# Patient Record
Sex: MALE | Race: WHITE | HISPANIC OR LATINO | Employment: PART TIME | URBAN - METROPOLITAN AREA
[De-identification: names, ages, dates, MRNs, and addresses within clinical notes are randomized per-mention and may not be internally consistent; named-entity substitution may affect disease eponyms.]

---

## 2017-02-18 ENCOUNTER — APPOINTMENT (OUTPATIENT)
Dept: LAB | Facility: HOSPITAL | Age: 42
End: 2017-02-18
Payer: COMMERCIAL

## 2017-02-18 ENCOUNTER — TRANSCRIBE ORDERS (OUTPATIENT)
Dept: LAB | Facility: HOSPITAL | Age: 42
End: 2017-02-18

## 2017-02-18 DIAGNOSIS — Z79.899 OTHER LONG TERM (CURRENT) DRUG THERAPY: ICD-10-CM

## 2017-02-18 DIAGNOSIS — G40.909 EPILEPSY WITHOUT STATUS EPILEPTICUS, NOT INTRACTABLE (HCC): ICD-10-CM

## 2017-02-18 LAB
ALBUMIN SERPL BCP-MCNC: 3.4 G/DL (ref 3.5–5)
ALP SERPL-CCNC: 62 U/L (ref 46–116)
ALT SERPL W P-5'-P-CCNC: 79 U/L (ref 12–78)
ANION GAP SERPL CALCULATED.3IONS-SCNC: 6 MMOL/L (ref 4–13)
AST SERPL W P-5'-P-CCNC: 69 U/L (ref 5–45)
BASOPHILS # BLD AUTO: 0.04 THOUSANDS/ΜL (ref 0–0.1)
BASOPHILS NFR BLD AUTO: 1 % (ref 0–1)
BILIRUB SERPL-MCNC: 1.12 MG/DL (ref 0.2–1)
BUN SERPL-MCNC: 18 MG/DL (ref 5–25)
CALCIUM SERPL-MCNC: 8.8 MG/DL (ref 8.3–10.1)
CHLORIDE SERPL-SCNC: 102 MMOL/L (ref 100–108)
CHOLEST SERPL-MCNC: 123 MG/DL (ref 50–200)
CO2 SERPL-SCNC: 29 MMOL/L (ref 21–32)
CREAT SERPL-MCNC: 1.08 MG/DL (ref 0.6–1.3)
EOSINOPHIL # BLD AUTO: 0.04 THOUSAND/ΜL (ref 0–0.61)
EOSINOPHIL NFR BLD AUTO: 1 % (ref 0–6)
ERYTHROCYTE [DISTWIDTH] IN BLOOD BY AUTOMATED COUNT: 14.3 % (ref 11.6–15.1)
GFR SERPL CREATININE-BSD FRML MDRD: >60 ML/MIN/1.73SQ M
GLUCOSE SERPL-MCNC: 90 MG/DL (ref 65–140)
HCT VFR BLD AUTO: 47.5 % (ref 36.5–49.3)
HDLC SERPL-MCNC: 52 MG/DL (ref 40–60)
HGB BLD-MCNC: 16.1 G/DL (ref 12–17)
LDLC SERPL CALC-MCNC: 55 MG/DL (ref 0–100)
LYMPHOCYTES # BLD AUTO: 2.41 THOUSANDS/ΜL (ref 0.6–4.47)
LYMPHOCYTES NFR BLD AUTO: 50 % (ref 14–44)
MCH RBC QN AUTO: 30.7 PG (ref 26.8–34.3)
MCHC RBC AUTO-ENTMCNC: 33.9 G/DL (ref 31.4–37.4)
MCV RBC AUTO: 91 FL (ref 82–98)
MONOCYTES # BLD AUTO: 0.51 THOUSAND/ΜL (ref 0.17–1.22)
MONOCYTES NFR BLD AUTO: 11 % (ref 4–12)
NEUTROPHILS # BLD AUTO: 1.76 THOUSANDS/ΜL (ref 1.85–7.62)
NEUTS SEG NFR BLD AUTO: 37 % (ref 43–75)
NRBC BLD AUTO-RTO: 0 /100 WBCS
PLATELET # BLD AUTO: 287 THOUSANDS/UL (ref 149–390)
PMV BLD AUTO: 11.1 FL (ref 8.9–12.7)
POTASSIUM SERPL-SCNC: 5.5 MMOL/L (ref 3.5–5.3)
PROT SERPL-MCNC: 8 G/DL (ref 6.4–8.2)
RBC # BLD AUTO: 5.24 MILLION/UL (ref 3.88–5.62)
SODIUM SERPL-SCNC: 137 MMOL/L (ref 136–145)
TRIGL SERPL-MCNC: 82 MG/DL
WBC # BLD AUTO: 4.77 THOUSAND/UL (ref 4.31–10.16)

## 2017-02-18 PROCEDURE — 36415 COLL VENOUS BLD VENIPUNCTURE: CPT

## 2017-02-18 PROCEDURE — 80061 LIPID PANEL: CPT

## 2017-02-18 PROCEDURE — 85025 COMPLETE CBC W/AUTO DIFF WBC: CPT

## 2017-02-18 PROCEDURE — 80053 COMPREHEN METABOLIC PANEL: CPT

## 2017-02-20 ENCOUNTER — GENERIC CONVERSION - ENCOUNTER (OUTPATIENT)
Dept: OTHER | Facility: OTHER | Age: 42
End: 2017-02-20

## 2017-02-23 ENCOUNTER — ALLSCRIPTS OFFICE VISIT (OUTPATIENT)
Dept: OTHER | Facility: OTHER | Age: 42
End: 2017-02-23

## 2017-05-30 ENCOUNTER — APPOINTMENT (OUTPATIENT)
Dept: LAB | Facility: HOSPITAL | Age: 42
End: 2017-05-30
Attending: PSYCHIATRY & NEUROLOGY
Payer: COMMERCIAL

## 2017-05-30 ENCOUNTER — TRANSCRIBE ORDERS (OUTPATIENT)
Dept: LAB | Facility: HOSPITAL | Age: 42
End: 2017-05-30

## 2017-05-30 DIAGNOSIS — G40.909 EPILEPSY WITHOUT STATUS EPILEPTICUS, NOT INTRACTABLE (HCC): ICD-10-CM

## 2017-05-30 PROCEDURE — 80177 DRUG SCRN QUAN LEVETIRACETAM: CPT

## 2017-05-30 PROCEDURE — 36415 COLL VENOUS BLD VENIPUNCTURE: CPT

## 2017-06-01 LAB — LEVETIRACETAM SERPL-MCNC: 8.8 UG/ML (ref 10–40)

## 2017-06-05 ENCOUNTER — GENERIC CONVERSION - ENCOUNTER (OUTPATIENT)
Dept: OTHER | Facility: OTHER | Age: 42
End: 2017-06-05

## 2017-06-08 ENCOUNTER — ALLSCRIPTS OFFICE VISIT (OUTPATIENT)
Dept: OTHER | Facility: OTHER | Age: 42
End: 2017-06-08

## 2017-07-18 ENCOUNTER — ALLSCRIPTS OFFICE VISIT (OUTPATIENT)
Dept: OTHER | Facility: OTHER | Age: 42
End: 2017-07-18

## 2017-07-18 DIAGNOSIS — R74.8 ABNORMAL LEVELS OF OTHER SERUM ENZYMES: ICD-10-CM

## 2017-07-29 ENCOUNTER — TRANSCRIBE ORDERS (OUTPATIENT)
Dept: LAB | Facility: HOSPITAL | Age: 42
End: 2017-07-29

## 2017-07-29 ENCOUNTER — APPOINTMENT (OUTPATIENT)
Dept: LAB | Facility: HOSPITAL | Age: 42
End: 2017-07-29
Payer: COMMERCIAL

## 2017-07-29 DIAGNOSIS — R74.8 ABNORMAL LEVELS OF OTHER SERUM ENZYMES: ICD-10-CM

## 2017-07-29 LAB
ALBUMIN SERPL BCP-MCNC: 3.7 G/DL (ref 3.5–5)
ALP SERPL-CCNC: 55 U/L (ref 46–116)
ALT SERPL W P-5'-P-CCNC: 52 U/L (ref 12–78)
ANION GAP SERPL CALCULATED.3IONS-SCNC: 8 MMOL/L (ref 4–13)
AST SERPL W P-5'-P-CCNC: 30 U/L (ref 5–45)
BILIRUB SERPL-MCNC: 1.05 MG/DL (ref 0.2–1)
BUN SERPL-MCNC: 18 MG/DL (ref 5–25)
CALCIUM SERPL-MCNC: 8.9 MG/DL (ref 8.3–10.1)
CHLORIDE SERPL-SCNC: 102 MMOL/L (ref 100–108)
CO2 SERPL-SCNC: 28 MMOL/L (ref 21–32)
CREAT SERPL-MCNC: 1.22 MG/DL (ref 0.6–1.3)
GFR SERPL CREATININE-BSD FRML MDRD: 74 ML/MIN/1.73SQ M
GLUCOSE P FAST SERPL-MCNC: 89 MG/DL (ref 65–99)
POTASSIUM SERPL-SCNC: 4.1 MMOL/L (ref 3.5–5.3)
PROT SERPL-MCNC: 7.9 G/DL (ref 6.4–8.2)
SODIUM SERPL-SCNC: 138 MMOL/L (ref 136–145)

## 2017-07-29 PROCEDURE — 80053 COMPREHEN METABOLIC PANEL: CPT

## 2017-07-29 PROCEDURE — 36415 COLL VENOUS BLD VENIPUNCTURE: CPT

## 2017-09-10 ENCOUNTER — GENERIC CONVERSION - ENCOUNTER (OUTPATIENT)
Dept: OTHER | Facility: OTHER | Age: 42
End: 2017-09-10

## 2018-01-10 NOTE — RESULT NOTES
Verified Results  (1) COMPREHENSIVE METABOLIC PANEL [id number] 89:21JH Agueda Gregory Order Number: [id number]     Test Name Result Flag Reference   GLUCOSE,RANDM 90 mg/dL     If the patient is fasting, the ADA then defines impaired fasting glucose as > 100 mg/dL and diabetes as > or equal to 123 mg/dL  SODIUM 137 mmol/L  136-145   POTASSIUM 5 5 mmol/L H 3 5-5 3   Slightly Hemolyzed; Results May be Affected   CHLORIDE 102 mmol/L  100-108   CARBON DIOXIDE 29 mmol/L  21-32   ANION GAP (CALC) 6 mmol/L  4-13   BLOOD UREA NITROGEN 18 mg/dL  5-25   CREATININE 1 08 mg/dL  0 60-1 30   Standardized to IDMS reference method   CALCIUM 8 8 mg/dL  8 3-10 1   BILI, TOTAL 1 12 mg/dL H 0 20-1 00   ALK PHOSPHATAS 62 U/L     ALT (SGPT) 79 U/L H 12-78   AST(SGOT) 69 U/L H 5-45   Slightly Hemolyzed; Results May be Affected   ALBUMIN 3 4 g/dL L 3 5-5 0   TOTAL PROTEIN 8 0 g/dL  6 4-8 2   eGFR Non-African American      >60 0 ml/min/1 73sq m   - Patient Instructions: This is a fasting blood test  Water,black tea or black  coffee only after 9:00pm the night before test Drink 2 glasses of water the morning of test   National Kidney Disease Education Program recommendations are as follows:  GFR calculation is accurate only with a steady state creatinine  Chronic Kidney disease less than 60 ml/min/1 73 sq  meters  Kidney failure less than 15 ml/min/1 73 sq  meters       (1) CBC/PLT/DIFF [id number] 09:32AM [facility] Order Number: [id number]     Test Name Result Flag Reference   WBC COUNT 4 77 Thousand/uL  4 31-10 16   RBC COUNT 5 24 Million/uL  3 88-5 62   HEMOGLOBIN 16 1 g/dL  12 0-17 0   HEMATOCRIT 47 5 %  36 5-49 3   MCV 91 fL  82-98   MCH 30 7 pg  26 8-34 3   MCHC 33 9 g/dL  31 4-37 4   RDW 14 3 %  11 6-15 1   MPV 11 1 fL  8 9-12 7   PLATELET COUNT 483 Thousands/uL  149-390   nRBC AUTOMATED 0 /100 WBCs     NEUTROPHILS RELATIVE PERCENT 37 % L 43-75   LYMPHOCYTES RELATIVE PERCENT 50 % H 14-44   MONOCYTES RELATIVE PERCENT 11 %  4-12   EOSINOPHILS RELATIVE PERCENT 1 %  0-6   BASOPHILS RELATIVE PERCENT 1 %  0-1   NEUTROPHILS ABSOLUTE COUNT 1 76 Thousands/? ??L L 1 85-7 62   LYMPHOCYTES ABSOLUTE COUNT 2 41 Thousands/? ??L  0 60-4 47   MONOCYTES ABSOLUTE COUNT 0 51 Thousand/? ??L  0 17-1 22   EOSINOPHILS ABSOLUTE COUNT 0 04 Thousand/? ??L  0 00-0 61   BASOPHILS ABSOLUTE COUNT 0 04 Thousands/? ??L  0 00-0 10   - Patient Instructions: This bloodwork is non-fasting  Please drink two glasses of water morning of bloodwork  - Patient Instructions: This bloodwork is non-fasting  Please drink two glasses of water morning of bloodwork  (1) LIPID PANEL, FASTING [id number] 09:32AM Lyndsey Rouse Order Number: [id number]     Test Name Result Flag Reference   CHOLESTEROL 123 mg/dL     HDL,DIRECT 52 mg/dL  40-60   Specimen collection should occur prior to Metamizole administration due to the potential for falsely depressed results  LDL CHOLESTEROL CALCULATED 55 mg/dL  0-100   - Patient Instructions: This is a fasting blood test  Water,black tea or black  coffee only after 9:00pm the night before test   Drink 2 glasses of water the morning of test     - Patient Instructions: This is a fasting blood test  Water,black tea or black  coffee only after 9:00pm the night before test Drink 2 glasses of water the morning of test   Triglyceride:         Normal              <150 mg/dl       Borderline High    150-199 mg/dl       High               200-499 mg/dl       Very High          >499 mg/dl  Cholesterol:         Desirable        <200 mg/dl      Borderline High  200-239 mg/dl      High             >239 mg/dl  HDL Cholesterol:        High    >59 mg/dL      Low     <41 mg/dL  LDL CALCULATED:    This screening LDL is a calculated result  It does not have the accuracy of the Direct Measured LDL in the monitoring of patients with hyperlipidemia and/or statin therapy     Direct Measure LDL (HQX521) must be ordered separately in these patients  TRIGLYCERIDES 82 mg/dL  <=150   Specimen collection should occur prior to N-Acetylcysteine or Metamizole administration due to the potential for falsely depressed results

## 2018-01-10 NOTE — PROGRESS NOTES
Assessment    1  Epilepsy (345 90) (G40 909)   2  Encounter for preventive health examination (V70 0) (Z00 00)   3  Elevated liver enzymes (790 5) (R74 8)   4  Excessive ear wax (380 4) (H61 20)    Discussion/Summary  Impression: health maintenance visit  Currently, he eats an adequate diet and has an inadequate exercise regimen  Prostate cancer screening: PSA is not indicated  Testicular cancer screening: testicular cancer screening is not indicated  Colorectal cancer screening: colorectal cancer screening is not indicated  The immunizations are up to date  Advice and education were given regarding nutrition, aerobic exercise and weight bearing exercise  Patient discussion: discussed with the patient, discussed with the patient's family  1  Health maintenance:  -annual physical, doing well  - flu shot previously given at [facility]    2  Epilepsy:  -well controlled, continue Keppra 500mg BID  -follow up with neurology 6/8/17 or sooner if needed, appreciate their recs    3  Excessive ear cerumen (wax)  -ear cleaning performed today, tolerated well  -continue Debrox ear drops as needed  -follow up as needed    4  Elevated liver enzymes  -AST 79, AST 69 (2/20/17)  -asymptomatic, continue to monitor and follow up in 6 months to repeat blood work  The patient was counseled regarding instructions for management, risk factor reductions, patient and family education, impressions, risks and benefits of treatment options, importance of compliance with treatment  Self Referrals: No      Chief Complaint  annual physical      History of Present Illness  , Adult Male: The patient is being seen for a health maintenance evaluation  General Health: The patient's health since the last visit is described as good  He denies vision problems  He denies hearing loss  Immunizations status: up to date  Lifestyle:  He consumes a diverse and healthy diet  He does not have any weight concerns  He does not exercise regularly   He does not use tobacco  He denies alcohol use  He denies drug use  Screening:   HPI: Patient is a pleasant 38 yo M presenting for annual physical  Patient has a h/o epilepsy that is well controlled with Keppra 500mg BID  Last seizure was 1998; follows yearly with neurologist Dr Marcelle Zamorano - next appt 6/8/17  Patient voices no complaints, denies HA, CP/SOB, n/v/c/d  Patient lives with his mother and 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  Patient has been using Debrox ear drops for excessive cerumen  Review of Systems    Constitutional: no fever and no chills  Eyes: no eyesight problems and no purulent discharge from the eyes  ENT: no earache, no nosebleeds, no hearing loss and no nasal discharge  Cardiovascular: no chest pain and no palpitations  Respiratory: no shortness of breath, no cough, no wheezing and no shortness of breath during exertion  Gastrointestinal: no abdominal pain, no nausea, no constipation and no diarrhea  Genitourinary: no dysuria and no incontinence  Musculoskeletal: no arthralgias and no myalgias  Integumentary: no itching and no skin wound  Neurological: no headache, no numbness, no dizziness, no convulsions and no fainting  Psychiatric: no anxiety and no depression  Active Problems    1  Developmental delay (783 40) (R62 50)   2  DM (diabetes mellitus screen) (V77 1) (Z13 1)   3  Epilepsy (345 90) (G40 909)   4  Excessive ear wax (380 4) (H61 20)   5  High risk medication use (V58 69) (Z79 899)   6  Hyperbilirubinemia (782 4) (E80 6)   7  Irritant contact dermatitis due to detergent (692 0) (L24 0)   8  Lipid screening (V77 91) (Z13 220)   9   Need for prophylactic vaccination and inoculation against influenza (V04 81) (Z23)    Past Medical History    · Epilepsy (345 90) (G40 909)   · History of headache (V13 89) (S02 377)   · History of Learning disability (315 2) (F81 9)    Surgical History    · History of Appendectomy    Family History  Mother    · No pertinent family history  Father    · No pertinent family history  Maternal Grandmother    · No pertinent family history  Paternal Grandmother    · No pertinent family history  Maternal Grandfather    · No pertinent family history  Paternal Grandfather    · No pertinent family history    Social History    · Never a smoker   · No alcohol use   · No caffeine use   · No drug use   · Single    Current Meds   1  Benadryl 25 MG TABS; TAKE 1 TABLET EVERY 4 TO 6 HOURS AS NEEDED for itching  This may cause sedation; Therapy: 69PKH7041 to (Josemanuel Del Castillo)  Requested for: 27ZAH9418; Last   Rx:11Mar2016 Ordered   2  Debrox 6 5 % Otic Solution; READ AND FOLLOW 'S INSTRUCTIONS   ON BOX; Therapy: 63Idl6369 to (Last Rx:40Yui7445)  Requested for: 31Fqa5605 Ordered   3  Fish Oil CAPS; Therapy: (Recorded:93Raa7345) to Recorded   4  Hydrocortisone 1 % External Cream; APPLY SPARINGLY AND RUB IN WELL TO    AFFECTED AREA(S) AS DIRECTED; Therapy: 90XFT5174 to (Last Rx:11Mar2016)  Requested for: 96OSR3869 Ordered   5  [address] [place] [address]; Therapy: (Recorded:89Uvx6901) to Recorded   6  LevETIRAcetam 500 MG Oral Tablet; Take 1 tablet twice daily; Therapy: 16CLS4795 to (Evaluate:29Jan2017)  Requested for: 42SLS2049; Last   Rx:60Olj4151 Ordered    Allergies    1  No Known Drug Allergies    Vitals   Recorded: 23Feb2017 03:22PM   Temperature 96 6 F   Heart Rate 72   Respiration 18   Systolic 039   Diastolic 70   Height 5 ft 9 in   Weight 176 lb 2 oz   BMI Calculated 26 01   BSA Calculated 1 96   Pain Scale 0     Physical Exam    Constitutional   General appearance: No acute distress, well appearing and well nourished  Eyes   Conjunctiva and lids: No swelling, erythema, or discharge  Ears, Nose, Mouth, and Throat   External inspection of ears and nose: Normal     Otoscopic examination: Abnormal   The right tympanic membrane was normal  The left tympanic membrane was obscured by cerumen  Cardiovascular   Auscultation of heart: Normal rate and rhythm, normal S1 and S2, without murmurs  Examination of extremities for edema and/or varicosities: Normal     Abdomen   Abdomen: Non-tender, no masses  Lymphatic   Palpation of lymph nodes in neck: No lymphadenopathy  Musculoskeletal   Gait and station: Normal     Psychiatric   Orientation to person, place and time: Normal     Mood and affect: Normal          Procedure    Procedure: cerumen removal    Indication: tympanic membrane(s) could not be visualized and cerumen impaction in the left ear  Prep: Debrox at home intermittently for 1 month prior to cleaning was placed in the canal prior to the procedure  Procedure Note: The procedure was performed by the Provider  A otoscope was placed in the ear canal(s) to visualize the ear canal debris  The ear was cleaned by using a curette  The procedure was successful  Post-Procedure:   Patient Status: the patient tolerated the procedure well  Complications: there were no complications  Patient instructions: avoid using q-tips and Continue Debrox PRN  Follow-up as needed  Attending Note  Attending Note: Attending Note: I discussed the case with the Resident and reviewed the Resident's note and I agree with the Resident management plan as it was presented to me  Level of Participation: I was present in clinic, but did not examine the patient  Diagnosis and Plan: Health maintenance: doing well  Epilepsy: stable, continue current medication, follow up with neurology  I agree with the Resident's note  Future Appointments    Date/Time Provider Specialty Site   06/08/2017 09:00 AM ALEX Multani   Neurology [address]     Signatures   Electronically signed by : Arianna Crawford DO; Feb 23 2017  4:25PM EST                       (Author)    Electronically signed by : ALEX Nur ; Mar  1 2017  8:33AM EST                       (Author)

## 2018-01-11 NOTE — RESULT NOTES
Verified Results  (1) LEVETIRACETAM [facility]) 63PQM1145 07:40AM Deena Pool Order Number: [id number]     Test Name Result Flag Reference   LEVETIRACETAM (KEPPRA) 8 8 ug/mL L 10 0 - 40 0   Performed at:  [address]  [id number]  : Cole Alva MD, Phone:  [phone]

## 2018-01-12 VITALS
TEMPERATURE: 96.6 F | RESPIRATION RATE: 18 BRPM | HEART RATE: 72 BPM | BODY MASS INDEX: 26.09 KG/M2 | DIASTOLIC BLOOD PRESSURE: 70 MMHG | WEIGHT: 176.13 LBS | HEIGHT: 69 IN | SYSTOLIC BLOOD PRESSURE: 110 MMHG

## 2018-01-13 VITALS
BODY MASS INDEX: 27.1 KG/M2 | SYSTOLIC BLOOD PRESSURE: 124 MMHG | RESPIRATION RATE: 16 BRPM | WEIGHT: 183.5 LBS | DIASTOLIC BLOOD PRESSURE: 71 MMHG | HEART RATE: 83 BPM | OXYGEN SATURATION: 98 %

## 2018-01-13 NOTE — RESULT NOTES
Verified Results  (1) LEVETIRACETAM ( KEPPRA) 71JON9694 08:04AM Tello White     Test Name Result Flag Reference   LEVETIRACETAM (KEPPRA) 9 6 ug/mL L 10 0 - 40 0   Performed at:  [address]  [id number]  : Mayco Rogers MD, Phone:  [phone]

## 2018-01-14 VITALS
TEMPERATURE: 96.9 F | HEART RATE: 80 BPM | HEIGHT: 69 IN | DIASTOLIC BLOOD PRESSURE: 80 MMHG | RESPIRATION RATE: 18 BRPM | SYSTOLIC BLOOD PRESSURE: 108 MMHG

## 2018-01-17 NOTE — PROGRESS NOTES
Assessment    1  Irritant contact dermatitis due to detergent (692 0) (L24 0)    Plan  Irritant contact dermatitis due to detergent    · Benadryl 25 MG Oral Tablet; TAKE 1 TABLET EVERY 4 TO 6 HOURS AS NEEDED  for itching  This may cause sedation   · Hydrocortisone 1 % External Cream; APPLY SPARINGLY AND RUB IN WELL TO   AFFECTED AREA(S) AS DIRECTED    Discussion/Summary    Contact dermatitis secondary to new soap: Most likely the patient's lesions are secondary to starting a new soap approximately one week before lesions and itching started  I asked him to change to prior soap that did not cause any irritations  The patient is to make a 2 week followup appointment and may cancel if improved  We will prescribe Benadryl as needed for itching  I also advised him I will send a hydrocortisone prescription to be used for spot treatment and not on the face  If not improved consider infection such as scabies  The treatment plan was reviewed with the patient/guardian  The patient/guardian understands and agrees with the treatment plan      Chief Complaint  Itching      History of Present Illness  Patient with diffuse pruritus and excoriations over his abdomen, back, arms and legs  This started approximately 2 weeks ago  Prior to that, 3 weeks ago, his mom changed so that they were using the household  She did not have this rash  She does come in close contact as she lives in the same house as the patient  She denies any itching symptoms  He denies intense pruritus after hot showers  No dermatographia  Review of Systems    Constitutional: no fever and no chills  Gastrointestinal: no nausea and no vomiting  Integumentary: as noted in HPI  Active Problems    1  Developmental delay (783 40) (R62 50)   2  DM (diabetes mellitus screen) (V77 1) (Z13 1)   3  Epilepsy (345 90) (G40 909)   4  Lipid screening (V77 91) (Z13 220)   5   Need for prophylactic vaccination and inoculation against influenza (V04 81) (Z23)    Past Medical History    1  Epilepsy (345 90) (G40 909)   2  History of headache (V13 89) (Z87 898)   3  History of Learning disability (315 2) (F81 9)    Surgical History    1  History of Appendectomy    Family History    1  No pertinent family history    2  No pertinent family history    3  No pertinent family history    4  No pertinent family history    5  No pertinent family history    6  No pertinent family history    Social History    · Never a smoker   · No alcohol use   · No caffeine use   · No drug use   · Single    Current Meds   1  Fish Oil CAPS; Therapy: (Recorded:41Lib0184) to Recorded   2  Flaxseed MISC; Therapy: (Recorded:20Xvd6923) to Recorded   3  [address] [employer] [place]; Therapy: (Recorded:04Feb2016) to Recorded   4  LevETIRAcetam 500 MG Oral Tablet; Take 1 tablet twice daily; Therapy: 64OXF9233 to (Evaluate:29Jan2017)  Requested for: 52HVC6118; Last   Rx:10Kkk9590 Ordered   5  Vitamin D3 1000 UNIT Oral Capsule; Therapy: (Recorded:37Szy5152) to Recorded    Allergies    1  No Known Drug Allergies    Vitals  Vital Signs [Data Includes: Current Encounter]    Recorded: 66ALQ5869 04:32PM   Temperature 96 9 F   Heart Rate 76   Respiration 16   Systolic 483   Diastolic 70   Height 5 ft 9 in   Weight 177 lb    BMI Calculated 26 14   BSA Calculated 1 96   Pain Scale 0     Physical Exam    Constitutional   General appearance: No acute distress, well appearing and well nourished  Eyes   Conjunctiva and lids: No swelling, erythema, or discharge  Ears, Nose, Mouth, and Throat   External inspection of ears and nose: Normal     Pulmonary   Respiratory effort: No increased work of breathing or signs of respiratory distress  Musculoskeletal   Gait and station: Normal     Skin   Examination of the skin for lesions: Abnormal   Diffuse macular rash on the arms, legs, abdomen and back  There is also minimal facial involvement  There are no burrows seen  Patient has not had dermatographia   No secondary infection  Psychiatric   Orientation to person, place and time: Normal     Mood and affect: Normal          Attending Note  Attending Note: I discussed the case with the Resident and reviewed the Resident's note, I supervised the Resident and I agree with the Resident management plan as it was presented to me  Level of Participation: I was present in clinic, but did not examine the patient  Comments/Additional Findings: contact dermatitis: avoidance, HC cream  I agree with the Resident's note  Signatures   Electronically signed by :  Julio Chappell, ; Mar 11 2016  5:10PM EST                       (Author)    Electronically signed by : ALEX Colilns ; Mar 13 2016  8:28PM EST                       (Author)

## 2018-03-29 ENCOUNTER — OFFICE VISIT (OUTPATIENT)
Dept: FAMILY MEDICINE CLINIC | Facility: CLINIC | Age: 43
End: 2018-03-29
Payer: COMMERCIAL

## 2018-03-29 VITALS
BODY MASS INDEX: 26.08 KG/M2 | TEMPERATURE: 96.4 F | SYSTOLIC BLOOD PRESSURE: 122 MMHG | HEIGHT: 70 IN | HEART RATE: 84 BPM | RESPIRATION RATE: 16 BRPM | DIASTOLIC BLOOD PRESSURE: 78 MMHG | WEIGHT: 182.2 LBS

## 2018-03-29 DIAGNOSIS — M65.4 DE QUERVAIN'S TENOSYNOVITIS, LEFT: Primary | ICD-10-CM

## 2018-03-29 PROCEDURE — 99213 OFFICE O/P EST LOW 20 MIN: CPT | Performed by: FAMILY MEDICINE

## 2018-03-29 RX ORDER — LEVETIRACETAM 500 MG/1
1 TABLET ORAL 2 TIMES DAILY
COMMUNITY
Start: 2014-09-05 | End: 2018-06-07 | Stop reason: SDUPTHER

## 2018-03-29 NOTE — PATIENT INSTRUCTIONS
[address]  [address] [facility], [facility] Harriet Baires  [phone]

## 2018-04-19 ENCOUNTER — OFFICE VISIT (OUTPATIENT)
Dept: FAMILY MEDICINE CLINIC | Facility: CLINIC | Age: 43
End: 2018-04-19
Payer: COMMERCIAL

## 2018-04-19 VITALS
BODY MASS INDEX: 26.93 KG/M2 | SYSTOLIC BLOOD PRESSURE: 112 MMHG | TEMPERATURE: 97.4 F | HEART RATE: 80 BPM | RESPIRATION RATE: 16 BRPM | DIASTOLIC BLOOD PRESSURE: 80 MMHG | WEIGHT: 181.8 LBS | HEIGHT: 69 IN

## 2018-04-19 DIAGNOSIS — M65.4 DE QUERVAIN'S TENOSYNOVITIS, LEFT: Primary | ICD-10-CM

## 2018-04-19 PROCEDURE — 99213 OFFICE O/P EST LOW 20 MIN: CPT | Performed by: FAMILY MEDICINE

## 2018-04-19 RX ORDER — MULTIVITAMIN
1 CAPSULE ORAL DAILY
COMMUNITY

## 2018-04-19 NOTE — PROGRESS NOTES
Amber Siddiqi 1975 male MRN: [id number]    Family Medicine Follow-up Visit    ASSESSMENT/PLAN  Problem List Items Addressed This Visit     De Quervain's tenosynovitis, left - Primary     -improved, but having persistent numbness and mild pain  -using incorrect OTC brace - prescribed corrected brace, thumb spica splint/brace  -follow up in 1 month if not improved  Consider occupational therapy or injection at this time if not further improved         Relevant Medications    Elastic Bandages & Supports (THUMB SPLINT/LEFT MEDIUM) MISC              Future Appointments  Date Time Provider Lisa Martinez   6/7/2018 2:00 PM Damián Lockhart MD NEURO [facility]          SUBJECTIVE  CC: Follow-up (wrist pain )      HPI:  Amber Siddiqi is a 40 y o  male who presents for follow up left De Queverain's tenosynovitis  At last visit 3/29/18, patient was advised to use a thumb spica splint and return if symptoms did not improve  Today, he notes using an OTC wrist neoprene brace that has improved his pain and numbness, but is noting persistent symptoms  Review of Systems   Constitutional: Negative for chills and fever  Respiratory: Negative for cough, chest tightness and shortness of breath  Cardiovascular: Negative for chest pain and palpitations  Musculoskeletal:        Wrist pain   Neurological: Positive for numbness         Historical Information   The patient history was reviewed as follows:    Past Medical History:   Diagnosis Date    Epilepsy ([address] )     last assessed: 6/8/2017    Learning disability     last assessed: 8/12/2014     Past Surgical History:   Procedure Laterality Date    APPENDECTOMY       Family History   Problem Relation Age of Onset    Hyperlipidemia Mother      high cholesterol    No Known Problems Father     No Known Problems Maternal Grandfather     No Known Problems Paternal Grandmother     No Known Problems Paternal Grandfather       Social History   History   Alcohol Use No History   Drug Use No     History   Smoking Status    Never Smoker   Smokeless Tobacco    Not on file       Medications:     Current Outpatient Prescriptions:     levETIRAcetam (KEPPRA) 500 mg tablet, Take 1 tablet by mouth 2 (two) times a day, Disp: , Rfl:     Multiple Vitamin (MULTIVITAMIN) capsule, Take 1 capsule by mouth daily, Disp: , Rfl:     Omega-3 Fatty Acids (FISH OIL) 645 MG CAPS, Take 1 capsule by mouth daily, Disp: , Rfl:     Elastic Bandages & Supports (THUMB SPLINT/LEFT MEDIUM) MISC, Thumb Spica Splint, Left , Disp: 1 each, Rfl: 0  No Known Allergies    OBJECTIVE    Vitals:   Vitals:    04/19/18 1458   BP: 112/80   Pulse: 80   Resp: 16   Temp: (!) 97 4 °F (36 3 °C)   Weight: 82 5 kg (181 lb 12 8 oz)   Height: 5' 9 2" (1 758 m)           Physical Exam   Constitutional: He is oriented to person, place, and time  He appears well-developed and well-nourished  No distress  HENT:   Head: Normocephalic and atraumatic  Eyes: Conjunctivae are normal  Right eye exhibits no discharge  Left eye exhibits no discharge  Musculoskeletal:   Left wrist/hand: (+) Finkelstein's, (+) Tinel's  Mid diffuse tenderness over dorsal and palmar aspect of wrist without pinpoint tenderness  Strength intact, sensation intact  No muscle wasting present  Neurological: He is alert and oriented to person, place, and time  No cranial nerve deficit  Skin: He is not diaphoretic  Nursing note and vitals reviewed                   Jaden Fitzpatrick DO, PGY-3  [facility]   4/20/2018

## 2018-04-20 NOTE — ASSESSMENT & PLAN NOTE
-improved, but having persistent numbness and mild pain  -using incorrect OTC brace - prescribed corrected brace, thumb spica splint/brace  -follow up in 1 month if not improved   Consider occupational therapy or injection at this time if not further improved

## 2018-06-06 NOTE — PROGRESS NOTES
Patient's Name: Mike Agarwal   Patient's : 1975   Visit Type: follow-up  Referring MD / PCP:  Suzette Richardson MD     Assessment:    Mr Mike Agarwal is a 40 y o  man with unknown/cryptogenic epilepsy syndrome, likely due to underlying brain abnormality associated with his cognitive developmental delays that is not visible on MRI brain scan or due to genetic factors  He has had at least 10 years of seizure freedom  His last break through seizure occurred when he was weaned off of phenobarbital  We transitioned him from phenobarbital to levetiracetam in /  He has no side effects from levetiracetam and no recurrent seizures  He has left hand / wrist complaint of pain, but due to his intellectual disability, he has a difficult time understanding the sensory testing to confirm the distribution of median neuropathy; however, there is no motor weakness of the left hand  Plan:   1 - despite low levels of levetiracetam he has not had a recurrent seizure, will continue his current dose Levetiracetam 500mg twice a day  2 - check levetiracetam level  3 - follow-up in 1 year (due to patient in resident clinic and always getting a new doctor every 2 years  )  4 - in regards to his left CPS, I would recommend continuing to use the wrist splint but he has to use it at work to minimize repetitive injury to the median nerve; I recommended that they look into getting a water-resistant wrist splint for him to use at work as a   5 - discussed that if the left carpal tunnel symptoms worsen where there is hand weakness, inability to hold onto objects, or difficulty with pinching grasp with thumb and index finger then further testing may be needed for decompression at the the carpal tunnel      Problem List Items Addressed This Visit        Nervous and Auditory    Epilepsy ([facility] [address] ) - Primary    Relevant Medications    levETIRAcetam (KEPPRA) 500 mg tablet    Other Relevant Orders    Levetiracetam level Other    Developmental delay          Chief Complaint:    Chief Complaint   Patient presents with    Seizures     None          HPI:    Shaylee Sosa is a 40 y o  right handed male here for follow-up evaluation of epilepsy  Interval History 6/7/2018  There have been no seizures or periods of altered awareness or loss of consciousness  He continues to hold a job as a  at [employer]  He works so much that he is developing carpal tunnel in the left hand  He has pain limited to the palmar surface of the left wrist but there is no hand weakness or difficulty holding on to dishes  AED/side effects/compliance:  Levetiracetam 500mg twice a day  No missed doses, mother regulates his medications  No side effects    Seizure semiology:  1  Generalized convulsion  2  Mother may notice some staring events but she is not sure if he is just inattentive  Prior Epilepsy History:  His first seizure occurred when he was 9months of age, convulsive; this was associated with a high fever  He was in [place], [place]  The fever went away, no meningitis, no infection, no clear etiology of fever  He continued to have seizures every week until he was 15 years old  He was eventually controlled on phenobarbital and Dilantin  Seizures stopped, Dilantin was weaned off when he was 9years old  Phenobarbital was weaned off when he was 30years old  Then at age 28 he had a convulsive seizure, phenobarbital was restarted  He has been seizure free for the past 8 years  Mother does not know who his prior neurologist was other than she was associated with [address] [place] [address] in 2008  Summary [id number]  We transitioned him off of phenobarbital and onto levetiracetam 500mg twice a day  His mother noticed that he is more alert and active during the day, loss about 10 lbs since he has discontinued phenobarbital   She has not notice any behavioral problems, aggression, or irritability      There have been no seizures and he has yearly follow-ups for medication refills  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      Special Features  Status epilepticus: No  Self Injury Seizures: No  Precipitating Factors: None    Epilepsy Risk Factors:  Abnormal pregnancy: No  Abnormal birth/: No  Abnormal Development: walking at 18 months, speech was delayed, required a speech therapist for school  Febrile seizures, simple: Yes  Febrile seizures, complex: No  CNS infection: No  Mental retardation: Yes, learning disability, unable to read/write  Cerebral palsy: No  Head injury (moderate/severe): No  CNS neoplasm: No  CNS malformation: No  Neurosurgical procedure: No  Stroke: No  Alcohol abuse: No  Drug abuse: No  Consanguinity: No  Family history Sz/epilepsy: Maternal grand-uncle had seizure, paternal uncle with seizures    Prior AEDs:  Phenytoin, phenobarbital, levetiracetam    Prior workup:  x   Imaging:  MRI brain 2008  Tiny nonspecific foci of increased T2 signal intensities are within the subcortical white matter of both anterior frontal lobes bilaterally nonspecific microvascular ischemic disease    EEGs:  EEG dated 2008 interpreted by Dr Eda Davis  normal sleep deprived EEG    EEG 9/10/2014 interpreted by Dr Elenita Campa  Normal awake, excess beta is present    Labs:  Component      Latest Ref Rng & Units 2017   WBC      4 31 - 10 16 Thousand/uL 4 77     RBC      3 88 - 5 62 Million/uL 5 24     Hemoglobin      12 0 - 17 0 g/dL 16 1     Hematocrit      36 5 - 49 3 % 47 5     Platelets      913 - 390 Thousands/uL 287     Sodium      136 - 145 mmol/L 137  138   Potassium      3 5 - 5 3 mmol/L 5 5 (H)  4 1   Chloride      100 - 108 mmol/L 102  102   CO2      21 - 32 mmol/L 29  28   Anion Gap      4 - 13 mmol/L 6  8   BUN      5 - 25 mg/dL 18  18   Creatinine      0 60 - 1 30 mg/dL 1 08  1 22   Glucose      65 - 140 mg/dL 90     Calcium      8 3 - 10 1 mg/dL 8 8  8 9 AST      5 - 45 U/L 69 (H)  30   ALT      12 - 78 U/L 79 (H)  52   Alkaline Phosphatase      46 - 116 U/L 62  55   Total Protein      6 4 - 8 2 g/dL 8 0  7 9   Albumin      3 5 - 5 0 g/dL 3 4 (L)  3 7   Total Bilirubin      0 20 - 1 00 mg/dL 1 12 (H)  1 05 (H)   eGFR      ml/min/1 73sq m >60 0  74   GLUCOSE FASTING      65 - 99 mg/dL   89   LEVETIRACETA (KEPPRA)      10 0 - 40 0 ug/mL  8 8 (L)        General exam   /70 (BP Location: Right arm, Patient Position: Sitting, Cuff Size: Adult)   Pulse 83   Ht 5' 9" (1 753 m)   Wt 85 kg (187 lb 4 8 oz)   BMI 27 66 kg/m²    Appearance: normally developed, appears well  Carotids: n/a  Cardiovascular: regular rate and rhythm and normal heart sounds  Pulmonary: clear to auscultation    HEENT: anicteric and moist mucus membranes / oral cavity   Fundoscopy: not assessed    Mental status  Orientation: alert and oriented to name, June, Thursday, unable to state year, Lenny, unable to name the stat  [employer] of Knowledge: limited   Attention and Concentration: he does not read or write, unable to name the days of the week going backwards  Current and Remote Memory:recalled 3/3 words after five minutes  Language: difficulty with initiating speech, limited output on his own, comprehension is intact and naming is intact    Cranial Nerves  CN 1: not tested  CN 2: pupils equal round reactive to direct and consenual light   CN 3, 4, 6: EOMI, no nystagmus  CN 5:not assessed  CN 7:muscles of facial expression are symmetric  CN 8:not assessed  CN 9, 10:no dysarthria present  CN 11:symmetric strength of sternocleidomastoid and trapezius muscles  CN 12:tongue is midline    Motor:  Bulk, Tone: normal bulk, normal tone  Pronation: not assessed  Strength: Symmetric strength of the arms and legs, no lateralizing weakness    Sensory:  Lighttouch: intact in all limbs  Pinprick: inconsistent response when testing the fingertips of both hands  Romberg:not assessed    Coordination:  FNF:FNF bilaterally intact  ROLAND:clumsy with the left hand / arm and clumsy with the right hand / arm  FFM:intact  Gait/Station:normal gait    Reflexes:  not assessed    Past Medical/Surgical History:  Patient Active Problem List   Diagnosis    De Quervain's tenosynovitis, left    Developmental delay    Hyperbilirubinemia    Epilepsy ([facility] [address] )    Carpal tunnel syndrome on left     Past Medical History:   Diagnosis Date    Epilepsy ([facility] )     last assessed: 6/8/2017    Learning disability     last assessed: 8/12/2014     Past Surgical History:   Procedure Laterality Date    APPENDECTOMY         Past Psychiatric History:  Depression: No  Anxiety: No  Psychosis: No    Medications:    Current Outpatient Prescriptions:     Elastic Bandages & Supports (THUMB SPLINT/LEFT MEDIUM) MISC, Thumb Spica Splint, Left , Disp: 1 each, Rfl: 0    levETIRAcetam (KEPPRA) 500 mg tablet, Take 1 tablet (500 mg total) by mouth 2 (two) times a day, Disp: 180 tablet, Rfl: 3    Multiple Vitamin (MULTIVITAMIN) capsule, Take 1 capsule by mouth daily, Disp: , Rfl:     Omega-3 Fatty Acids (FISH OIL) 645 MG CAPS, Take 1 capsule by mouth daily, Disp: , Rfl:     Allergies:  No Known Allergies    Family history:  Family History   Problem Relation Age of Onset    Hyperlipidemia Mother      high cholesterol     Maternal grand-uncle had seizure, paternal uncle with seizures    Social History  Living situation:  lives with mother  Work:  No, went to school to 25years old, he works as a  nine out of 12 months of the year  Driving:  No   reports that he has never smoked  He has never used smokeless tobacco  He reports that he does not drink alcohol or use drugs      Review of Systems  A review of at least 12 organ/systems was obtained by the medical assistant and reviewed by me, including additional positives/negatives:  A review of at least 12 organ/systems was evaluated and there are no complaints  Decision making was of high-complexity due to the patient's high risk condition (seizures), psychiatric and neuropsychological comorbidities, behavioral problems, memory and cognitive problems and medication side effects  The total amount of time spent with the patient was 31 minutes  More than 50% of this time was devoted to counseling and coordination of care  Issues addressed during this clinic visit included overall management, medication counseling or moanitoring (including adverse effects, side effects and risks of antiepileptic medications)     Start time: 2:20PM  End time: 2:51PM

## 2018-06-07 ENCOUNTER — OFFICE VISIT (OUTPATIENT)
Dept: NEUROLOGY | Facility: CLINIC | Age: 43
End: 2018-06-07
Payer: COMMERCIAL

## 2018-06-07 VITALS
HEART RATE: 83 BPM | WEIGHT: 187.3 LBS | HEIGHT: 69 IN | BODY MASS INDEX: 27.74 KG/M2 | DIASTOLIC BLOOD PRESSURE: 70 MMHG | SYSTOLIC BLOOD PRESSURE: 118 MMHG

## 2018-06-07 DIAGNOSIS — G40.909 NONINTRACTABLE EPILEPSY WITHOUT STATUS EPILEPTICUS, UNSPECIFIED EPILEPSY TYPE (HCC): Primary | ICD-10-CM

## 2018-06-07 DIAGNOSIS — G56.02 CARPAL TUNNEL SYNDROME ON LEFT: ICD-10-CM

## 2018-06-07 DIAGNOSIS — R62.50 DEVELOPMENTAL DELAY: ICD-10-CM

## 2018-06-07 PROCEDURE — 99214 OFFICE O/P EST MOD 30 MIN: CPT | Performed by: PSYCHIATRY & NEUROLOGY

## 2018-06-07 RX ORDER — LEVETIRACETAM 500 MG/1
500 TABLET ORAL 2 TIMES DAILY
Qty: 180 TABLET | Refills: 3 | Status: SHIPPED | OUTPATIENT
Start: 2018-06-07 | End: 2019-04-22 | Stop reason: SDUPTHER

## 2018-06-07 NOTE — LETTER
2018     Cortney Saenz, [address] [address]    Patient: Kavita Justice   YOB: 1975   Date of Visit: 2018       Dear Dr Abdiaziz Richard:    Thank you for referring Mark Son to me for evaluation  Below are my notes for this consultation  If you have questions, please do not hesitate to call me  I look forward to following your patient along with you  Sincerely,        Lala Gardner MD        CC: Leroy Raman MD  2018  5:40 PM  Sign at close encounter  Patient's Name: Kavita Justice   Patient's : 1975   Visit Type: follow-up  Referring MD / PCP:  Cortney Saenz MD     Assessment:    Mr  Kavita Justice is a 40 y o  man with unknown/cryptogenic epilepsy syndrome, likely due to underlying brain abnormality associated with his cognitive developmental delays that is not visible on MRI brain scan or due to genetic factors  He has had at least 10 years of seizure freedom  His last break through seizure occurred when he was weaned off of phenobarbital  We transitioned him from phenobarbital to levetiracetam in /  He has no side effects from levetiracetam and no recurrent seizures  He has left hand / wrist complaint of pain, but due to his intellectual disability, he has a difficult time understanding the sensory testing to confirm the distribution of median neuropathy; however, there is no motor weakness of the left hand  Plan:   1 - despite low levels of levetiracetam he has not had a recurrent seizure, will continue his current dose Levetiracetam 500mg twice a day  2 - check levetiracetam level  3 - follow-up in 1 year (due to patient in resident clinic and always getting a new doctor every 2 years  )  4 - in regards to his left CPS, I would recommend continuing to use the wrist splint but he has to use it at work to minimize repetitive injury to the median nerve; I recommended that they look into getting a water-resistant wrist splint for him to use at work as a   5 - discussed that if the left carpal tunnel symptoms worsen where there is hand weakness, inability to hold onto objects, or difficulty with pinching grasp with thumb and index finger then further testing may be needed for decompression at the the carpal tunnel  Problem List Items Addressed This Visit        Nervous and Auditory    Epilepsy ([facility] [address] ) - Primary    Relevant Medications    levETIRAcetam (KEPPRA) 500 mg tablet    Other Relevant Orders    Levetiracetam level       Other    Developmental delay          Chief Complaint:    Chief Complaint   Patient presents with    Seizures     None          HPI:    Rl Case is a 40 y o  right handed male here for follow-up evaluation of epilepsy  Interval History 6/7/2018  There have been no seizures or periods of altered awareness or loss of consciousness  He continues to hold a job as a  at [employer]  He works so much that he is developing carpal tunnel in the left hand  He has pain limited to the palmar surface of the left wrist but there is no hand weakness or difficulty holding on to dishes  AED/side effects/compliance:  Levetiracetam 500mg twice a day  No missed doses, mother regulates his medications  No side effects    Seizure semiology:  1  Generalized convulsion  2  Mother may notice some staring events but she is not sure if he is just inattentive  Prior Epilepsy History:  His first seizure occurred when he was 9months of age, convulsive; this was associated with a high fever  He was in [place], [place]  The fever went away, no meningitis, no infection, no clear etiology of fever  He continued to have seizures every week until he was 15 years old  He was eventually controlled on phenobarbital and Dilantin  Seizures stopped, Dilantin was weaned off when he was 9years old  Phenobarbital was weaned off when he was 30years old    Then at age 28 he had a convulsive seizure, phenobarbital was restarted  He has been seizure free for the past 8 years  Mother does not know who his prior neurologist was other than she was associated with [facility] [address] [facility] in   Summary [id number]  We transitioned him off of phenobarbital and onto levetiracetam 500mg twice a day  His mother noticed that he is more alert and active during the day, loss about 10 lbs since he has discontinued phenobarbital   She has not notice any behavioral problems, aggression, or irritability  There have been no seizures and he has yearly follow-ups for medication refills  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      Special Features  Status epilepticus: No  Self Injury Seizures: No  Precipitating Factors: None    Epilepsy Risk Factors:  Abnormal pregnancy: No  Abnormal birth/: No  Abnormal Development: walking at 18 months, speech was delayed, required a speech therapist for school  Febrile seizures, simple: Yes  Febrile seizures, complex: No  CNS infection: No  Mental retardation: Yes, learning disability, unable to read/write  Cerebral palsy: No  Head injury (moderate/severe): No  CNS neoplasm: No  CNS malformation: No  Neurosurgical procedure: No  Stroke: No  Alcohol abuse: No  Drug abuse: No  Consanguinity: No  Family history Sz/epilepsy: Maternal grand-uncle had seizure, paternal uncle with seizures    Prior AEDs:  Phenytoin, phenobarbital, levetiracetam    Prior workup:  x   Imaging:  MRI brain 2008  Tiny nonspecific foci of increased T2 signal intensities are within the subcortical white matter of both anterior frontal lobes bilaterally nonspecific microvascular ischemic disease    EEGs:  EEG dated 2008 interpreted by Dr Joceline Iverson  normal sleep deprived EEG    EEG 9/10/2014 interpreted by Dr Foreign Prasad  Normal awake, excess beta is present    Labs:  Component      Latest Ref Rng & Units 2017   WBC      4 31 - 10 16 Thousand/uL 4 77     RBC      3 88 - 5 62 Million/uL 5 24     Hemoglobin      12 0 - 17 0 g/dL 16 1     Hematocrit      36 5 - 49 3 % 47 5     Platelets      419 - 390 Thousands/uL 287     Sodium      136 - 145 mmol/L 137  138   Potassium      3 5 - 5 3 mmol/L 5 5 (H)  4 1   Chloride      100 - 108 mmol/L 102  102   CO2      21 - 32 mmol/L 29  28   Anion Gap      4 - 13 mmol/L 6  8   BUN      5 - 25 mg/dL 18  18   Creatinine      0 60 - 1 30 mg/dL 1 08  1 22   Glucose      65 - 140 mg/dL 90     Calcium      8 3 - 10 1 mg/dL 8 8  8 9   AST      5 - 45 U/L 69 (H)  30   ALT      12 - 78 U/L 79 (H)  52   Alkaline Phosphatase      46 - 116 U/L 62  55   Total Protein      6 4 - 8 2 g/dL 8 0  7 9   Albumin      3 5 - 5 0 g/dL 3 4 (L)  3 7   Total Bilirubin      0 20 - 1 00 mg/dL 1 12 (H)  1 05 (H)   eGFR      ml/min/1 73sq m >60 0  74   GLUCOSE FASTING      65 - 99 mg/dL   89   LEVETIRACETA (KEPPRA)      10 0 - 40 0 ug/mL  8 8 (L)        General exam   /70 (BP Location: Right arm, Patient Position: Sitting, Cuff Size: Adult)   Pulse 83   Ht 5' 9" (1 753 m)   Wt 85 kg (187 lb 4 8 oz)   BMI 27 66 kg/m²     Appearance: normally developed, appears well  Carotids: n/a  Cardiovascular: regular rate and rhythm and normal heart sounds  Pulmonary: clear to auscultation    HEENT: anicteric and moist mucus membranes / oral cavity   Fundoscopy: not assessed    Mental status  Orientation: alert and oriented to name, June, Thursday, unable to state year, [place] [address], unable to name the stat  [employer] of Knowledge: limited   Attention and Concentration: he does not read or write, unable to name the days of the week going backwards  Current and Remote Memory:recalled 3/3 words after five minutes  Language: difficulty with initiating speech, limited output on his own, comprehension is intact and naming is intact    Cranial Nerves  CN 1: not tested  CN 2: pupils equal round reactive to direct and consenual light   CN 3, 4, 6: EOMI, no nystagmus  CN 5:not assessed  CN 7:muscles of facial expression are symmetric  CN 8:not assessed  CN 9, 10:no dysarthria present  CN 11:symmetric strength of sternocleidomastoid and trapezius muscles  CN 12:tongue is midline    Motor:  Bulk, Tone: normal bulk, normal tone  Pronation: not assessed  Strength: Symmetric strength of the arms and legs, no lateralizing weakness    Sensory:  Lighttouch: intact in all limbs  Pinprick: inconsistent response when testing the fingertips of both hands  Romberg:not assessed    Coordination:  FNF:FNF bilaterally intact  ROLAND:clumsy with the left hand / arm and clumsy with the right hand / arm  FFM:intact  Gait/Station:normal gait    Reflexes:  not assessed    Past Medical/Surgical History:  Patient Active Problem List   Diagnosis    De Quervain's tenosynovitis, left    Developmental delay    Hyperbilirubinemia    Epilepsy ([facility] )    Carpal tunnel syndrome on left     Past Medical History:   Diagnosis Date    Epilepsy ([facility] )     last assessed: 6/8/2017    Learning disability     last assessed: 8/12/2014     Past Surgical History:   Procedure Laterality Date    APPENDECTOMY         Past Psychiatric History:  Depression: No  Anxiety: No  Psychosis: No    Medications:    Current Outpatient Prescriptions:     Elastic Bandages & Supports (THUMB SPLINT/LEFT MEDIUM) MISC, Thumb Spica Splint, Left , Disp: 1 each, Rfl: 0    levETIRAcetam (KEPPRA) 500 mg tablet, Take 1 tablet (500 mg total) by mouth 2 (two) times a day, Disp: 180 tablet, Rfl: 3    Multiple Vitamin (MULTIVITAMIN) capsule, Take 1 capsule by mouth daily, Disp: , Rfl:     Omega-3 Fatty Acids (FISH OIL) 645 MG CAPS, Take 1 capsule by mouth daily, Disp: , Rfl:     Allergies:  No Known Allergies    Family history:  Family History   Problem Relation Age of Onset    Hyperlipidemia Mother      high cholesterol     Maternal grand-uncle had seizure, paternal uncle with seizures    Social History  Living situation: lives with mother  Work:  No, went to school to 25years old, he works as a  nine out of 12 months of the year  Driving:  No   reports that he has never smoked  He has never used smokeless tobacco  He reports that he does not drink alcohol or use drugs  Review of Systems  A review of at least 12 organ/systems was obtained by the medical assistant and reviewed by me, including additional positives/negatives:  A review of at least 12 organ/systems was evaluated and there are no complaints  Decision making was of high-complexity due to the patient's high risk condition (seizures), psychiatric and neuropsychological comorbidities, behavioral problems, memory and cognitive problems and medication side effects  The total amount of time spent with the patient was 31 minutes  More than 50% of this time was devoted to counseling and coordination of care  Issues addressed during this clinic visit included overall management, medication counseling or moanitoring (including adverse effects, side effects and risks of antiepileptic medications)     Start time: 2:20PM  End time: 2:51PM

## 2018-06-07 NOTE — PATIENT INSTRUCTIONS
PLAN:  1 - continue with Levetiracetam 500mg twice a day  2 - check levetiracetam level  3 - for carpal tunnel syndrome, continue with using the left wrist splint, until symptoms improve  4 - follow-up in 1 year sooner if you have a seizure  I discussed seizure safety and seizure first aid with the patient  Limits would be no unprotected heights (ladders, standing on chairs/tables), should not swim alone (need partners or ), cooking with a partner to avoid burn injuries, showers instead of baths  I reviewed that convulsive seizures typically last about 2 minutes with about 15-30 minutes of recovery  Should a seizure last more than 5 minutes or patient fails to recover after 30 minutes or there are multiple seizures in a day or if there is a suspected head injury then EMS should be called or they go to the nearest emergency room  If he only experiences altered awareness, confusion, or focal seizures, then they may call the office for guidance  Seizure first aid consists of preventing the patient from wandering or removal of dangerous objects or prevention of injury, for convulsive seizures, position the patient on the ground, may place a pillow under the head, turn the patient to his side, no objects or reaching for the mouth, no restraints but prevent injuries by removing glasses or sharp/heavy objects, and time the duration of the seizure  I recommend that he obtain a medical alert bracelet that states "Seizure disorder" or "Epilepsy" along with any medication allergies

## 2018-06-07 NOTE — PROGRESS NOTES
Review of Systems   Constitutional: Negative  Negative for appetite change and fever  HENT: Negative  Negative for hearing loss, tinnitus, trouble swallowing and voice change  Eyes: Negative  Negative for photophobia and pain  Respiratory: Negative  Negative for shortness of breath  Cardiovascular: Negative  Negative for palpitations  Gastrointestinal: Negative  Negative for nausea and vomiting  Endocrine: Negative  Negative for cold intolerance and heat intolerance  Genitourinary: Negative  Negative for dysuria, frequency and urgency  Musculoskeletal: Negative  Negative for myalgias and neck pain  Skin: Negative  Negative for rash  Neurological: Negative  Negative for dizziness, tremors, seizures, syncope, facial asymmetry, speech difficulty, weakness, light-headedness, numbness and headaches  Hematological: Negative  Does not bruise/bleed easily  Psychiatric/Behavioral: Negative  Negative for confusion, hallucinations and sleep disturbance

## 2018-06-07 NOTE — LETTER
2018     Daljit Chiu, [address] [facility] [address] [address]    Patient: Amber Siddiqi   YOB: 1975   Date of Visit: 2018       Dear Dr Sebastian Rogel:    Thank you for referring Ana Cristina Peters to me for evaluation  Below are my notes for this consultation  If you have questions, please do not hesitate to call me  I look forward to following your patient along with you  Sincerely,        Damián Lockhart MD        CC: No Recipients  Damián Lockhart MD  2018  5:40 PM  Sign at close encounter  Patient's Name: Amber Siddiqi   Patient's : 1975   Visit Type: follow-up  Referring MD / PCP:  Daljit Chiu MD     Assessment:    Mr Amber Siddiqi is a 40 y o  man with unknown/cryptogenic epilepsy syndrome, likely due to underlying brain abnormality associated with his cognitive developmental delays that is not visible on MRI brain scan or due to genetic factors  He has had at least 10 years of seizure freedom  His last break through seizure occurred when he was weaned off of phenobarbital  We transitioned him from phenobarbital to levetiracetam in /  He has no side effects from levetiracetam and no recurrent seizures  He has left hand / wrist complaint of pain, but due to his intellectual disability, he has a difficult time understanding the sensory testing to confirm the distribution of median neuropathy; however, there is no motor weakness of the left hand  Plan:   1 - despite low levels of levetiracetam he has not had a recurrent seizure, will continue his current dose Levetiracetam 500mg twice a day  2 - check levetiracetam level  3 - follow-up in 1 year (due to patient in resident clinic and always getting a new doctor every 2 years  )  4 - in regards to his left CPS, I would recommend continuing to use the wrist splint but he has to use it at work to minimize repetitive injury to the median nerve; I recommended that they look into getting a water-resistant wrist splint for him to use at work as a   5 - discussed that if the left carpal tunnel symptoms worsen where there is hand weakness, inability to hold onto objects, or difficulty with pinching grasp with thumb and index finger then further testing may be needed for decompression at the the carpal tunnel  Problem List Items Addressed This Visit        Nervous and Auditory    Epilepsy ([facility] [address] ) - Primary    Relevant Medications    levETIRAcetam (KEPPRA) 500 mg tablet    Other Relevant Orders    Levetiracetam level       Other    Developmental delay          Chief Complaint:    Chief Complaint   Patient presents with    Seizures     None          HPI:    Secundino Goodell is a 40 y o  right handed male here for follow-up evaluation of epilepsy  Interval History 6/7/2018  There have been no seizures or periods of altered awareness or loss of consciousness  He continues to hold a job as a  at [employer]  He works so much that he is developing carpal tunnel in the left hand  He has pain limited to the palmar surface of the left wrist but there is no hand weakness or difficulty holding on to dishes  AED/side effects/compliance:  Levetiracetam 500mg twice a day  No missed doses, mother regulates his medications  No side effects    Seizure semiology:  1  Generalized convulsion  2  Mother may notice some staring events but she is not sure if he is just inattentive  Prior Epilepsy History:  His first seizure occurred when he was 9months of age, convulsive; this was associated with a high fever  He was in [place], [place]  The fever went away, no meningitis, no infection, no clear etiology of fever  He continued to have seizures every week until he was 15 years old  He was eventually controlled on phenobarbital and Dilantin  Seizures stopped, Dilantin was weaned off when he was 9years old  Phenobarbital was weaned off when he was 30years old    Then at age 28 he had a convulsive seizure, phenobarbital was restarted  He has been seizure free for the past 8 years  Mother does not know who his prior neurologist was other than she was associated with [address] [place] [address] in   Summary [id number]  We transitioned him off of phenobarbital and onto levetiracetam 500mg twice a day  His mother noticed that he is more alert and active during the day, loss about 10 lbs since he has discontinued phenobarbital   She has not notice any behavioral problems, aggression, or irritability  There have been no seizures and he has yearly follow-ups for medication refills  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      Special Features  Status epilepticus: No  Self Injury Seizures: No  Precipitating Factors: None    Epilepsy Risk Factors:  Abnormal pregnancy: No  Abnormal birth/: No  Abnormal Development: walking at 18 months, speech was delayed, required a speech therapist for school  Febrile seizures, simple: Yes  Febrile seizures, complex: No  CNS infection: No  Mental retardation: Yes, learning disability, unable to read/write  Cerebral palsy: No  Head injury (moderate/severe): No  CNS neoplasm: No  CNS malformation: No  Neurosurgical procedure: No  Stroke: No  Alcohol abuse: No  Drug abuse: No  Consanguinity: No  Family history Sz/epilepsy: Maternal grand-uncle had seizure, paternal uncle with seizures    Prior AEDs:  Phenytoin, phenobarbital, levetiracetam    Prior workup:  x   Imaging:  MRI brain 2008  Tiny nonspecific foci of increased T2 signal intensities are within the subcortical white matter of both anterior frontal lobes bilaterally nonspecific microvascular ischemic disease    EEGs:  EEG dated 2008 interpreted by Dr Hetal Zhou  normal sleep deprived EEG    EEG 9/10/2014 interpreted by Dr Arlene Jackson  Normal awake, excess beta is present    Labs:  Component      Latest Ref Rng & Units 2017   WBC      4 31 - 10 16 Thousand/uL 4  77     RBC      3 88 - 5 62 Million/uL 5 24     Hemoglobin      12 0 - 17 0 g/dL 16 1     Hematocrit      36 5 - 49 3 % 47 5     Platelets      691 - 390 Thousands/uL 287     Sodium      136 - 145 mmol/L 137  138   Potassium      3 5 - 5 3 mmol/L 5 5 (H)  4 1   Chloride      100 - 108 mmol/L 102  102   CO2      21 - 32 mmol/L 29  28   Anion Gap      4 - 13 mmol/L 6  8   BUN      5 - 25 mg/dL 18  18   Creatinine      0 60 - 1 30 mg/dL 1 08  1 22   Glucose      65 - 140 mg/dL 90     Calcium      8 3 - 10 1 mg/dL 8 8  8 9   AST      5 - 45 U/L 69 (H)  30   ALT      12 - 78 U/L 79 (H)  52   Alkaline Phosphatase      46 - 116 U/L 62  55   Total Protein      6 4 - 8 2 g/dL 8 0  7 9   Albumin      3 5 - 5 0 g/dL 3 4 (L)  3 7   Total Bilirubin      0 20 - 1 00 mg/dL 1 12 (H)  1 05 (H)   eGFR      ml/min/1 73sq m >60 0  74   GLUCOSE FASTING      65 - 99 mg/dL   89   LEVETIRACETA (KEPPRA)      10 0 - 40 0 ug/mL  8 8 (L)        General exam   /70 (BP Location: Right arm, Patient Position: Sitting, Cuff Size: Adult)   Pulse 83   Ht 5' 9" (1 753 m)   Wt 85 kg (187 lb 4 8 oz)   BMI 27 66 kg/m²     Appearance: normally developed, appears well  Carotids: n/a  Cardiovascular: regular rate and rhythm and normal heart sounds  Pulmonary: clear to auscultation    HEENT: anicteric and moist mucus membranes / oral cavity   Fundoscopy: not assessed    Mental status  Orientation: alert and oriented to name, June, Thursday, unable to state year, [address], unable to name the stat  [employer] of Knowledge: limited   Attention and Concentration: he does not read or write, unable to name the days of the week going backwards  Current and Remote Memory:recalled 3/3 words after five minutes  Language: difficulty with initiating speech, limited output on his own, comprehension is intact and naming is intact    Cranial Nerves  CN 1: not tested  CN 2: pupils equal round reactive to direct and consenual light   CN 3, 4, 6: EOMI, no nystagmus  CN 5:not assessed  CN 7:muscles of facial expression are symmetric  CN 8:not assessed  CN 9, 10:no dysarthria present  CN 11:symmetric strength of sternocleidomastoid and trapezius muscles  CN 12:tongue is midline    Motor:  Bulk, Tone: normal bulk, normal tone  Pronation: not assessed  Strength: Symmetric strength of the arms and legs, no lateralizing weakness    Sensory:  Lighttouch: intact in all limbs  Pinprick: inconsistent response when testing the fingertips of both hands  Romberg:not assessed    Coordination:  FNF:FNF bilaterally intact  ROLAND:clumsy with the left hand / arm and clumsy with the right hand / arm  FFM:intact  Gait/Station:normal gait    Reflexes:  not assessed    Past Medical/Surgical History:  Patient Active Problem List   Diagnosis    De Quervain's tenosynovitis, left    Developmental delay    Hyperbilirubinemia    Epilepsy ([facility] )    Carpal tunnel syndrome on left     Past Medical History:   Diagnosis Date    Epilepsy ([facility] )     last assessed: 6/8/2017    Learning disability     last assessed: 8/12/2014     Past Surgical History:   Procedure Laterality Date    APPENDECTOMY         Past Psychiatric History:  Depression: No  Anxiety: No  Psychosis: No    Medications:    Current Outpatient Prescriptions:     Elastic Bandages & Supports (THUMB SPLINT/LEFT MEDIUM) MISC, Thumb Spica Splint, Left , Disp: 1 each, Rfl: 0    levETIRAcetam (KEPPRA) 500 mg tablet, Take 1 tablet (500 mg total) by mouth 2 (two) times a day, Disp: 180 tablet, Rfl: 3    Multiple Vitamin (MULTIVITAMIN) capsule, Take 1 capsule by mouth daily, Disp: , Rfl:     Omega-3 Fatty Acids (FISH OIL) 645 MG CAPS, Take 1 capsule by mouth daily, Disp: , Rfl:     Allergies:  No Known Allergies    Family history:  Family History   Problem Relation Age of Onset    Hyperlipidemia Mother      high cholesterol     Maternal grand-uncle had seizure, paternal uncle with seizures    Social History  Living situation:  lives with mother  Work: No, went to school to 25years old, he works as a  nine out of 12 months of the year  Driving:  No   reports that he has never smoked  He has never used smokeless tobacco  He reports that he does not drink alcohol or use drugs  Review of Systems  A review of at least 12 organ/systems was obtained by the medical assistant and reviewed by me, including additional positives/negatives:  A review of at least 12 organ/systems was evaluated and there are no complaints  Decision making was of high-complexity due to the patient's high risk condition (seizures), psychiatric and neuropsychological comorbidities, behavioral problems, memory and cognitive problems and medication side effects  The total amount of time spent with the patient was 31 minutes  More than 50% of this time was devoted to counseling and coordination of care  Issues addressed during this clinic visit included overall management, medication counseling or moanitoring (including adverse effects, side effects and risks of antiepileptic medications)     Start time: 2:20PM  End time: 2:51PM

## 2018-06-13 ENCOUNTER — TELEPHONE (OUTPATIENT)
Dept: NEUROLOGY | Facility: CLINIC | Age: 43
End: 2018-06-13

## 2018-06-13 DIAGNOSIS — G56.02 CARPAL TUNNEL SYNDROME ON LEFT: ICD-10-CM

## 2018-06-13 NOTE — TELEPHONE ENCOUNTER
Pt's mother (Shira Monte) called to f/u rx for wrist brace  RX printed at [facility] 6/7/18, but unable to locate the script  [facility] recommended to send rx to [facility]  Can you pls enter updated rx for the wrist brace?     Thank you          [facility] (mother)

## 2018-06-13 NOTE — TELEPHONE ENCOUNTER
Hello,    The script for wrist splint has been faxed today to [facility] to fax number: [phone]

## 2018-06-13 NOTE — TELEPHONE ENCOUNTER
Created separate order only encounter for the Futuro wrist splint, water resistant  Printed out in [facility], signed it and Lydia Cooper my MA has it  Do you want her to fax it to [facility] equipment? Please let the mother know that it will be sent there

## 2018-08-10 ENCOUNTER — OFFICE VISIT (OUTPATIENT)
Dept: FAMILY MEDICINE CLINIC | Facility: CLINIC | Age: 43
End: 2018-08-10
Payer: COMMERCIAL

## 2018-08-10 VITALS
RESPIRATION RATE: 16 BRPM | SYSTOLIC BLOOD PRESSURE: 122 MMHG | BODY MASS INDEX: 26.77 KG/M2 | TEMPERATURE: 97.5 F | DIASTOLIC BLOOD PRESSURE: 80 MMHG | HEIGHT: 70 IN | WEIGHT: 187 LBS | HEART RATE: 68 BPM

## 2018-08-10 DIAGNOSIS — E80.6 HYPERBILIRUBINEMIA: ICD-10-CM

## 2018-08-10 DIAGNOSIS — Z00.00 MEDICARE ANNUAL WELLNESS VISIT, INITIAL: Primary | ICD-10-CM

## 2018-08-10 DIAGNOSIS — E66.3 OVERWEIGHT (BMI 25.0-29.9): ICD-10-CM

## 2018-08-10 PROCEDURE — G0438 PPPS, INITIAL VISIT: HCPCS | Performed by: FAMILY MEDICINE

## 2018-08-10 PROCEDURE — 3725F SCREEN DEPRESSION PERFORMED: CPT | Performed by: FAMILY MEDICINE

## 2018-08-10 PROCEDURE — 3008F BODY MASS INDEX DOCD: CPT | Performed by: FAMILY MEDICINE

## 2018-08-10 NOTE — PROGRESS NOTES
MEDICARE ANNUAL WELLNESS VISIT, INITIAL VISIT    Assessment and Plan:    Problem List Items Addressed This Visit     None      Visit Diagnoses     Medicare annual wellness visit, initial    -  Primary        Health Maintenance Due   Topic Date Due    HIV SCREENING  1975    Depression Screening PHQ-9  1975    DTaP,Tdap,and Td Vaccines (1 - Tdap) 08/12/1997         HPI:  Jonah Short is a 40 y o  male here for his Initial Wellness Visit  Patient Active Problem List   Diagnosis    De Quervain's tenosynovitis, left    Developmental delay    Hyperbilirubinemia    Epilepsy ([facility] [address] )    Carpal tunnel syndrome on left     Past Medical History:   Diagnosis Date    Epilepsy ([facility] [address] )     last assessed: 6/8/2017    Learning disability     last assessed: 8/12/2014     Past Surgical History:   Procedure Laterality Date    APPENDECTOMY       Family History   Problem Relation Age of Onset    Hyperlipidemia Mother         high cholesterol     History   Smoking Status    Never Smoker   Smokeless Tobacco    Never Used     History   Alcohol Use No      History   Drug Use No       Current Outpatient Prescriptions   Medication Sig Dispense Refill    levETIRAcetam (KEPPRA) 500 mg tablet Take 1 tablet (500 mg total) by mouth 2 (two) times a day 180 tablet 3    Multiple Vitamin (MULTIVITAMIN) capsule Take 1 capsule by mouth daily      Omega-3 Fatty Acids (FISH OIL) 645 MG CAPS Take 1 capsule by mouth daily      Elastic Bandages & Supports (FUTURO WRIST BRACE WATER RES) MISC by Does not apply route as needed (for carpal tunnel symptoms) Left hand wrist splint, water resistent 1 each 0    Elastic Bandages & Supports (THUMB SPLINT/LEFT MEDIUM) MISC Thumb Spica Splint, Left  1 each 0     No current facility-administered medications for this visit        No Known Allergies  Immunization History   Administered Date(s) Administered    Influenza 09/09/2017    Influenza Quadrivalent Preservative Free 3 years and older IM 10/05/2015       Patient Care Team:  Huey Cummings MD as PCP - General (Family Medicine)      Medicare Screening Tests and Risk Assessments:  AWV Clinical     ISAR:   Previous hospitalizations?:  No       Once in a Lifetime Medicare Screening:   EKG performed?:  No    AAA screening performed? (if performed, please add date to Health Maintenance):  No       Medicare Screening Tests and Risk Assessment:   AAA Risk Assessment    None Indicated:  Yes    Osteoporosis Risk Assessment    None indicated:  Yes    HIV Risk Assessment        Drug and Alcohol Use:   Tobacco use    Cigarettes:  never smoker    Smokeless:  never used smokeless tobacco    Tobacco use duration    Tobacco Cessation Readiness    Alcohol use    Alcohol use:  never    Alcohol Treatment Readiness   Illicit Drug Use    Drug use:  never        Diet & Exercise:   Diet   What is your diet?:  Regular   How many servings a day of the following:   Fruits and Vegetables:  3-4 Meat:  1-2   Whole Grains:  1     Soda:  0   Coffee:  0 Tea:  0   Exercise        Cognitive Impairment Screening:   Depression screening preformed:  Yes Depression screen score:  0   Cognitive Impairment Screening    Do you have difficulty learning or retaining new information?:  Yes        Functional Ability/Level of Safety:   Hearing    Hearing Impairment Assessment    Current Activities    Help needed with the folllowing:    Using the phone:  Yes Transportation:  Yes   Shopping:  Yes Preparing Meals:  Yes   Doing Housework:  Yes Doing Laundry:  Yes   Managing Medications:  No Managing Money:  Yes   ADL    Fall Risk   Injury History       Home Safety:   Home Safety Risk Factors   Unfamilar with surroundings:  No Uneven floors:  No   Stairs or handrail saftey risk:  No Loose rugs:  No   Household clutter:  No Poor household lighting:  No   No grab bars in bathroom:  Yes Further evaluation needed:  No       Advanced Directives:   Advanced Directives    Living Will:  No    Advanced directive: No    Patient's End of Life Decisions        Urinary Incontinence:   Do you have urinary incontinence?:  No        Glaucoma:            Provider Screening     Preventative Screening/Counseling:   Cardiovascular Screening/Counseling:   (Labs Q5 years, EKG optional one-time)   General:  Risks and Benefits Discussed, Screening Current Counseling:  Healthy Diet, Healthy Weight Due for: Lab Panel/Analytes:  Lipid Panel         Diabetes Screening/Counseling:   (2 tests/year if Pre-Diabetes or 1 test/year if no Diabetes)   General:  Risks and Benefits Discussed Counseling:  Healthy Diet, Healthy Weight, Improve Physical Activity Due for: Labs:  Blood Glucose         Colorectal Cancer Screening/Counseling:   (FOBT Q1 yr; Flex Sig Q4 yrs or Q10 yrs after Screening Colonoscopy; Screening Colonoscpy Q2 yrs High Risk or Q10 yrs Low Risk; Barium Enema Q2 yrs High Risk or Q4 yrs Low Risk)   General:  Screening Not Indicated Counseling:  high fiber diet          Prostate Cancer Screening/Counseling:   (Annual)    General:  Screening Not Indicated          Breast Cancer Screening/Counseling:   (Baseline Age 28 - 43; Annual Age 36+)         Cervical Cancer Screening/Counseling:   (Annual for High Risk or Childbearing Age with Abnormal Pap in Last 3 yrs; Every 2 all others)         Osteoporosis Screening/Counseling:   (Every 2 Yrs if at risk or more if medically necessary)   General:  Screening Not Indicated           AAA Screening/Counseling:   (Once per Lifetime with risk factors)    General:  Screening Not Indicated           Glaucoma Screening/Counseling:   (Annual)   General:  Screening Not Indicated          HIV Screening/Counseling:   (Voluntary; Once annually for high risk OR 3 times for Pregnancy at diagnosis of IUP; 3rd trimester; and at Labor   General:  Risks and Benefits Discussed, Screening Not Indicated, Patient Declines           Hepatitis C Screening:   Hepatitis C Counseling Provided:   Yes Immunizations:   Influenza (annual):   Influenza UTD This Year       Other Preventative Couseling (Non-Medicare Wellness Visit Required):   nutrition counseling performed, fall prevention education provided, car/seat belt/driving safety reviewed, alcohol use counseling provided, sunscreen education provided, weight reduction was discussed, Skin self-exam counseling given, Increased physical activity counseling given       Referrals (Non-Medicare Wellness Visit Required):       Medical Equipment/Suppliers:             ALEX Childs  PGY-2  [place] [address]

## 2018-08-10 NOTE — PATIENT INSTRUCTIONS

## 2018-08-14 ENCOUNTER — APPOINTMENT (OUTPATIENT)
Dept: LAB | Facility: HOSPITAL | Age: 43
End: 2018-08-14
Payer: COMMERCIAL

## 2018-08-14 DIAGNOSIS — E80.6 HYPERBILIRUBINEMIA: ICD-10-CM

## 2018-08-14 DIAGNOSIS — E66.3 OVERWEIGHT (BMI 25.0-29.9): ICD-10-CM

## 2018-08-14 DIAGNOSIS — Z00.00 MEDICARE ANNUAL WELLNESS VISIT, INITIAL: ICD-10-CM

## 2018-08-14 LAB
ALBUMIN SERPL BCP-MCNC: 3.5 G/DL (ref 3.5–5)
ALP SERPL-CCNC: 50 U/L (ref 46–116)
ALT SERPL W P-5'-P-CCNC: 45 U/L (ref 12–78)
ANION GAP SERPL CALCULATED.3IONS-SCNC: 5 MMOL/L (ref 4–13)
AST SERPL W P-5'-P-CCNC: 30 U/L (ref 5–45)
BILIRUB SERPL-MCNC: 0.96 MG/DL (ref 0.2–1)
BUN SERPL-MCNC: 15 MG/DL (ref 5–25)
CALCIUM SERPL-MCNC: 8.5 MG/DL (ref 8.3–10.1)
CHLORIDE SERPL-SCNC: 105 MMOL/L (ref 100–108)
CHOLEST SERPL-MCNC: 155 MG/DL (ref 50–200)
CO2 SERPL-SCNC: 27 MMOL/L (ref 21–32)
CREAT SERPL-MCNC: 1.15 MG/DL (ref 0.6–1.3)
GFR SERPL CREATININE-BSD FRML MDRD: 78 ML/MIN/1.73SQ M
GLUCOSE P FAST SERPL-MCNC: 85 MG/DL (ref 65–99)
HDLC SERPL-MCNC: 38 MG/DL (ref 40–60)
LDLC SERPL CALC-MCNC: 80 MG/DL (ref 0–100)
NONHDLC SERPL-MCNC: 117 MG/DL
POTASSIUM SERPL-SCNC: 3.9 MMOL/L (ref 3.5–5.3)
PROT SERPL-MCNC: 7.8 G/DL (ref 6.4–8.2)
SODIUM SERPL-SCNC: 137 MMOL/L (ref 136–145)
TRIGL SERPL-MCNC: 184 MG/DL

## 2018-08-14 PROCEDURE — 80053 COMPREHEN METABOLIC PANEL: CPT

## 2018-08-14 PROCEDURE — 80061 LIPID PANEL: CPT

## 2018-08-14 PROCEDURE — 36415 COLL VENOUS BLD VENIPUNCTURE: CPT

## 2018-09-25 ENCOUNTER — APPOINTMENT (OUTPATIENT)
Dept: URGENT CARE | Age: 43
End: 2018-09-25
Payer: OTHER MISCELLANEOUS

## 2018-09-25 PROCEDURE — G0382 LEV 3 HOSP TYPE B ED VISIT: HCPCS

## 2018-09-25 PROCEDURE — 99283 EMERGENCY DEPT VISIT LOW MDM: CPT

## 2018-10-02 ENCOUNTER — APPOINTMENT (OUTPATIENT)
Dept: URGENT CARE | Age: 43
End: 2018-10-02
Payer: OTHER MISCELLANEOUS

## 2018-10-02 PROCEDURE — 99213 OFFICE O/P EST LOW 20 MIN: CPT | Performed by: PREVENTIVE MEDICINE

## 2018-10-16 ENCOUNTER — APPOINTMENT (OUTPATIENT)
Dept: URGENT CARE | Age: 43
End: 2018-10-16
Payer: OTHER MISCELLANEOUS

## 2018-10-16 PROCEDURE — 99213 OFFICE O/P EST LOW 20 MIN: CPT | Performed by: PREVENTIVE MEDICINE

## 2018-11-28 ENCOUNTER — OFFICE VISIT (OUTPATIENT)
Dept: FAMILY MEDICINE CLINIC | Facility: CLINIC | Age: 43
End: 2018-11-28

## 2018-11-28 VITALS
SYSTOLIC BLOOD PRESSURE: 118 MMHG | BODY MASS INDEX: 26.86 KG/M2 | HEIGHT: 70 IN | WEIGHT: 187.6 LBS | DIASTOLIC BLOOD PRESSURE: 72 MMHG | RESPIRATION RATE: 14 BRPM | TEMPERATURE: 96.5 F | HEART RATE: 72 BPM

## 2018-11-28 DIAGNOSIS — Z23 NEED FOR TUBERCULOSIS VACCINATION: Primary | ICD-10-CM

## 2018-11-28 DIAGNOSIS — Z00.00 PHYSICAL EXAM: ICD-10-CM

## 2018-11-28 PROBLEM — M65.4 DE QUERVAIN'S TENOSYNOVITIS, LEFT: Status: RESOLVED | Noted: 2018-04-19 | Resolved: 2018-11-28

## 2018-11-28 PROBLEM — G56.02 CARPAL TUNNEL SYNDROME ON LEFT: Status: RESOLVED | Noted: 2018-06-07 | Resolved: 2018-11-28

## 2018-11-28 RX ORDER — INFLUENZA A VIRUS A/MICHIGAN/45/2015 X-275 (H1N1) ANTIGEN (FORMALDEHYDE INACTIVATED), INFLUENZA A VIRUS A/HONG KONG/4801/2014 X-263B (H3N2) ANTIGEN (FORMALDEHYDE INACTIVATED), INFLUENZA B VIRUS B/PHUKET/3073/2013 ANTIGEN (FORMALDEHYDE INACTIVATED), AND INFLUENZA B VIRUS B/BRISBANE/60/2008 ANTIGEN (FORMALDEHYDE INACTIVATED) 15; 15; 15; 15 UG/.5ML; UG/.5ML; UG/.5ML; UG/.5ML
INJECTION, SUSPENSION INTRAMUSCULAR
Refills: 0 | COMMUNITY
Start: 2018-10-06

## 2018-11-28 NOTE — ASSESSMENT & PLAN NOTE
Patient received flu shot 2 weeks ago  Td booster in September  Ppd done today  Patient will come back for nurse visit in 2 days for reading  Patient's physical form is in my triage been  DEBBIE hyman  will fill it out with results

## 2018-11-28 NOTE — PROGRESS NOTES
Assessment/Plan:    Epilepsy ([address] )  Continue current treatment per neurologist   Alexia Kimbrough 500 mg b i d  Healthcare maintenance  Patient received flu shot 2 weeks ago  Td booster in September  Ppd done today  Patient will come back for nurse visit in 2 days for reading  Patient's physical form is in my triage been  DEBBIE hyman  will fill it out with results  Problem List Items Addressed This Visit     None      Visit Diagnoses     Need for tuberculosis vaccination    -  Primary    Relevant Orders    TB Skin Test    Physical exam                Subjective:      Patient ID: Balaji Teran is a 44 y o  male  Akil Grajeda is a 56-year-old here for a work physical   He works at [employer] in [facility]  He is healthy and has no complaints  He has a history of epilepsy  He follows with a neurologist and his last seizure was over 12 years ago  He is currently on Keppra 500 b i d        The following portions of the patient's history were reviewed and updated as appropriate: allergies, current medications, past family history, past medical history, past social history, past surgical history and problem list     Review of Systems   Constitutional: Negative for appetite change, chills, fever and unexpected weight change  HENT: Negative for congestion, ear discharge, ear pain, postnasal drip, rhinorrhea, sinus pain, sinus pressure, sore throat and trouble swallowing  Eyes: Negative for pain and visual disturbance  Respiratory: Negative for cough and shortness of breath  Cardiovascular: Negative for chest pain, palpitations and leg swelling  Gastrointestinal: Negative for abdominal pain, constipation, diarrhea, nausea and vomiting  Endocrine: Negative for cold intolerance, heat intolerance, polydipsia, polyphagia and polyuria  Genitourinary: Negative for decreased urine volume, difficulty urinating, dysuria, flank pain, frequency and urgency     Musculoskeletal: Negative for arthralgias and myalgias  Skin: Negative for color change  Neurological: Negative for dizziness, seizures, syncope, weakness and headaches  Psychiatric/Behavioral: Negative for dysphoric mood and sleep disturbance  The patient is not nervous/anxious  All other systems reviewed and are negative  Objective:      /72 (BP Location: Left arm, Patient Position: Sitting, Cuff Size: Large)   Pulse 72   Temp (!) 96 5 °F (35 8 °C) (Tympanic)   Resp 14   Ht 5' 10 2" (1 783 m)   Wt 85 1 kg (187 lb 9 6 oz)   BMI 26 76 kg/m²          Physical Exam   Constitutional: He is oriented to person, place, and time  He appears well-developed and well-nourished  No distress  HENT:   Head: Normocephalic and atraumatic  Mouth/Throat: Oropharynx is clear and moist  No oropharyngeal exudate  Eyes: Conjunctivae and EOM are normal  Right eye exhibits no discharge  Left eye exhibits no discharge  Neck: Normal range of motion  Neck supple  No thyromegaly present  Cardiovascular: Normal rate, regular rhythm and normal heart sounds  Pulmonary/Chest: Effort normal and breath sounds normal    Abdominal: Soft  Bowel sounds are normal  There is no tenderness  Musculoskeletal: Normal range of motion  Lymphadenopathy:     He has no cervical adenopathy  Neurological: He is oriented to person, place, and time  Skin: Skin is warm and dry  He is not diaphoretic  Psychiatric: He has a normal mood and affect   His behavior is normal

## 2018-11-30 ENCOUNTER — CLINICAL SUPPORT (OUTPATIENT)
Dept: FAMILY MEDICINE CLINIC | Facility: CLINIC | Age: 43
End: 2018-11-30

## 2018-11-30 DIAGNOSIS — Z11.1 ENCOUNTER FOR PPD SKIN TEST READING: Primary | ICD-10-CM

## 2018-11-30 LAB
INDURATION: NORMAL MM
TB SKIN TEST: NEGATIVE

## 2018-12-03 ENCOUNTER — TELEPHONE (OUTPATIENT)
Dept: FAMILY MEDICINE CLINIC | Facility: CLINIC | Age: 43
End: 2018-12-03

## 2018-12-03 ENCOUNTER — TELEPHONE (OUTPATIENT)
Dept: NEUROLOGY | Facility: CLINIC | Age: 43
End: 2018-12-03

## 2018-12-03 NOTE — TELEPHONE ENCOUNTER
Patient called to request to be referred to Neurology in [place]  He was seeing Dr Merary Zhang in [place] [facility]  They moved to [facility]  He is scheduled to see him in March and was hoping to transfer to another Neurologist in [place]  He has [place] [facility]  Please call

## 2018-12-03 NOTE — TELEPHONE ENCOUNTER
Patient's mother is requesting to see a different epileptologist at [facility] since Dr Jenae Wood is no longer available at this location  Patient is scheduled with Dr Jenae Wood 3/29 at [facility], but does not want to go to [facility], she only wants to go to the [facility] office  Please advise

## 2018-12-04 NOTE — TELEPHONE ENCOUNTER
Spoke patient's mother, Anali Orion, and rescheduled appt with Lillian in [place] as indicated  appt card mailed home

## 2018-12-04 NOTE — TELEPHONE ENCOUNTER
Pt was able to get an appt with same office   It was the Dr who moved to [facility] , not the entire practice

## 2019-02-10 NOTE — PROGRESS NOTES
Patient ID: Luis De La Fuente is a 44 y o  male  Assessment/Plan:  Non intractable epilepsy with out status epilepticus, unspecified epilepsy type  -- patient is clinically stable with/unknown cryptogenic epilepsy syndrome likely due to underlying brain abnormality associated with cognitive developmental delays  He has been seizure-free for at least 10 years  Last episode occurred while weaning off of medication  He has been on Keppra for several years now without any recurrent episodes  --continue current dose of Keppra, patient does run low therapeutic levels  -- have asked that patient obtain updated level as well as CMP  -- did review with patient and mom potential risk factors for breakthrough activity  He does work at [employer], he needs to continue on the day shift and order to avoid possible shift changes, and in particularly sleep pattern changes  -    Paresthesias , left hand, given complaints, may be related to median neuropathy, pt with positive findings on exam  --continue with wrist splint  --consider EMG        No problem-specific Assessment & Plan notes found for this encounter  Diagnoses and all orders for this visit:    Partial idiopathic epilepsy with seizures of localized onset, not intractable, without status epilepticus ([facility] [address] )  -     Levetiracetam level; Future  -     Comprehensive metabolic panel; Future    Paresthesia           Subjective:    HPI     HPI:    Luis De La Fuente is a 44 y o  right handed male here for follow-up evaluation of epilepsy  patient is accompanied by his mother to aid  History and information      Interval History 2/11/19  There have been no seizures or periods of altered awareness or loss of consciousness  Patient continues to do quite well  He is compliant with his Keppra 500 mg twice a day  No reported side effects  He continues to work at [employer] in [facility]    He needs to continue on day shift to avoid  Shift changes, and possible sleep pattern disturbance  he does describe some pain in his left wrist radiating into his hand  He denies any dropping of objects  Does seem to be difficult however when lifting heavy pans  He does have a splint which he uses primarily after work and at nighttime  AED/side effects/compliance:  Levetiracetam 500mg twice a day  No missed doses, mother regulates his medications  No side effects     Seizure semiology:  1  Generalized convulsion  2  Mother may notice some staring events but she is not sure if he is just inattentive      Prior Epilepsy History:  His first seizure occurred when he was 9months of age, convulsive; this was associated with a high fever  He was in [place], [place]  The fever went away, no meningitis, no infection, no clear etiology of fever  He continued to have seizures every week until he was 15 years old  He was eventually controlled on phenobarbital and Dilantin  Seizures stopped, Dilantin was weaned off when he was 9years old  Phenobarbital was weaned off when he was 30years old  Then at age 28 he had a convulsive seizure, phenobarbital was restarted  He has been seizure free for the past 8 years        Mother does not know who his prior neurologist was other than she was associated with [address] [place] [address] in 2008      Summary [id number]  We transitioned him off of phenobarbital and onto levetiracetam 500mg twice a day  His mother noticed that he is more alert and active during the day, loss about 10 lbs since he has discontinued phenobarbital   She has not notice any behavioral problems, aggression, or irritability      There have been no seizures and he has yearly follow-ups for medication refills  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  History 6/7/2018  There have been no seizures or periods of altered awareness or loss of consciousness  He continues to hold a job as a  at [employer]    He works so much that he is developing carpal tunnel in the left hand  He has pain limited to the palmar surface of the left wrist but there is no hand weakness or difficulty holding on to dishes        Special Features  Status epilepticus: No  Self Injury Seizures: No  Precipitating Factors: None     Epilepsy Risk Factors:  Abnormal pregnancy: No  Abnormal birth/: No  Abnormal Development: walking at 18 months, speech was delayed, required a speech therapist for school  Febrile seizures, simple: Yes  Febrile seizures, complex: No  CNS infection: No  Mental retardation: Yes, learning disability, unable to read/write  Cerebral palsy: No  Head injury (moderate/severe): No  CNS neoplasm: No  CNS malformation: No  Neurosurgical procedure: No  Stroke: No  Alcohol abuse: No  Drug abuse: No  Consanguinity: No  Family history Sz/epilepsy: Maternal grand-uncle had seizure, paternal uncle with seizures     Prior AEDs:  Phenytoin, phenobarbital, levetiracetam     Prior workup:  x   Imaging:  MRI brain 2008  Tiny nonspecific foci of increased T2 signal intensities are within the subcortical white matter of both anterior frontal lobes bilaterally nonspecific microvascular ischemic disease     EEGs:  EEG dated 2008 interpreted by Dr Gabino James  normal sleep deprived EEG     EEG 9/10/2014 interpreted by Dr Álvaro Cruz        The following portions of the patient's history were reviewed and updated as appropriate: allergies, current medications, past family history, past medical history, past social history, past surgical history and problem list          Objective:    Blood pressure 110/70, pulse 60, height 5' 10" (1 778 m), weight 84 8 kg (187 lb)  Physical Exam   Constitutional: He appears well-developed  HENT:   Head: Normocephalic  Eyes: Pupils are equal, round, and reactive to light  Neck: Normal range of motion  Cardiovascular: Normal rate  Pulmonary/Chest: Effort normal    Musculoskeletal: Normal range of motion  He exhibits no edema  Neurological: He is alert  He has normal strength and normal reflexes  Coordination normal    Psychiatric: His speech is normal  His affect is blunt  He is slowed  Speech hypophonic and slow,  No aphasia       Neurological Exam  Mental Status  Awake and alert  Oriented only to person and place  Speech is normal  Able to name objects  Follows two-step commands  Language:  Patient does not read or write       Cranial Nerves  CN III, IV, VI: Diminished smooth pursuit  Pupils equal round and reactive to light bilaterally  CN VII: Full and symmetric facial movement  CN XI: Shoulder shrug strength is normal     Motor  Normal muscle bulk throughout  Normal muscle tone  No abnormal involuntary movements  Strength is 5/5 throughout all four extremities  Sensory  Sensation is intact to light touch, pinprick, vibration and proprioception in all four extremities  Reflexes  Deep tendon reflexes are 2+ and symmetric in all four extremities with downgoing toes bilaterally  Tinel sign positive left wrist and ulnar groove  Coordination  Finger-to-nose, rapid alternating movements and heel-to-shin normal bilaterally without dysmetria  Gait  Casual gait is normal including stance, stride, and arm swing  ROS:    Review of Systems   Constitutional: Negative  HENT: Negative  Eyes: Negative  Respiratory: Negative  Cardiovascular: Negative  Gastrointestinal: Negative  Endocrine: Negative  Genitourinary: Negative  Musculoskeletal: Negative  Skin: Negative  Allergic/Immunologic: Negative  Neurological: Positive for numbness  Negative for tremors, seizures, weakness and headaches  Hematological: Negative  Psychiatric/Behavioral: Negative  Negative for behavioral problems, confusion and sleep disturbance        ROS updated in personally reviewed with patient today's visit

## 2019-02-11 ENCOUNTER — OFFICE VISIT (OUTPATIENT)
Dept: NEUROLOGY | Facility: CLINIC | Age: 44
End: 2019-02-11
Payer: COMMERCIAL

## 2019-02-11 VITALS
HEART RATE: 60 BPM | HEIGHT: 70 IN | WEIGHT: 187 LBS | DIASTOLIC BLOOD PRESSURE: 70 MMHG | SYSTOLIC BLOOD PRESSURE: 110 MMHG | BODY MASS INDEX: 26.77 KG/M2

## 2019-02-11 DIAGNOSIS — R20.2 PARESTHESIA: ICD-10-CM

## 2019-02-11 DIAGNOSIS — G40.009 PARTIAL IDIOPATHIC EPILEPSY WITH SEIZURES OF LOCALIZED ONSET, NOT INTRACTABLE, WITHOUT STATUS EPILEPTICUS (HCC): Primary | ICD-10-CM

## 2019-02-11 PROCEDURE — 99214 OFFICE O/P EST MOD 30 MIN: CPT | Performed by: NURSE PRACTITIONER

## 2019-02-11 NOTE — LETTER
February 11, 2019     Patient: Hong Braxton   YOB: 1975   Date of Visit: 2/11/2019       To Whom it May Concern:    Juno Brizuela is under my professional care  He was seen in my office on 2/11/2019  He is being followed for seizure, stable for several years on medication  Patient needs to stay on day shift to avoid sleep deprivation, shift changes which can trigger seizure activity  If you have any questions or concerns, please don't hesitate to call           Sincerely,          OMAR Prado        CC: No Recipients

## 2019-02-11 NOTE — PATIENT INSTRUCTIONS
Continue current medication Keppra 500mg 1 tab twice a day    Needs to stay on day shift to avoid sleep deprivation ect as seizure triggers

## 2019-03-07 ENCOUNTER — TELEPHONE (OUTPATIENT)
Dept: NEUROLOGY | Facility: CLINIC | Age: 44
End: 2019-03-07

## 2019-03-07 NOTE — TELEPHONE ENCOUNTER
Pt's mother calls to have pt's pharmacy updated to [facility]  Pharmacy updated at this time

## 2019-04-22 ENCOUNTER — OFFICE VISIT (OUTPATIENT)
Dept: NEUROLOGY | Facility: CLINIC | Age: 44
End: 2019-04-22
Payer: COMMERCIAL

## 2019-04-22 VITALS
DIASTOLIC BLOOD PRESSURE: 82 MMHG | HEIGHT: 71 IN | WEIGHT: 186.3 LBS | SYSTOLIC BLOOD PRESSURE: 120 MMHG | BODY MASS INDEX: 26.08 KG/M2 | HEART RATE: 73 BPM

## 2019-04-22 DIAGNOSIS — R20.2 PARESTHESIA: ICD-10-CM

## 2019-04-22 DIAGNOSIS — G40.909 NONINTRACTABLE EPILEPSY WITHOUT STATUS EPILEPTICUS, UNSPECIFIED EPILEPSY TYPE (HCC): Primary | ICD-10-CM

## 2019-04-22 PROCEDURE — 99214 OFFICE O/P EST MOD 30 MIN: CPT | Performed by: NURSE PRACTITIONER

## 2019-04-22 RX ORDER — LEVETIRACETAM 500 MG/1
500 TABLET ORAL 2 TIMES DAILY
Qty: 180 TABLET | Refills: 3 | Status: SHIPPED | OUTPATIENT
Start: 2019-04-22 | End: 2019-05-07 | Stop reason: SDUPTHER

## 2019-04-23 ENCOUNTER — TELEPHONE (OUTPATIENT)
Dept: NEUROLOGY | Facility: CLINIC | Age: 44
End: 2019-04-23

## 2019-05-02 ENCOUNTER — TELEPHONE (OUTPATIENT)
Dept: NEUROLOGY | Facility: CLINIC | Age: 44
End: 2019-05-02

## 2019-05-05 DIAGNOSIS — G40.909 NONINTRACTABLE EPILEPSY WITHOUT STATUS EPILEPTICUS, UNSPECIFIED EPILEPSY TYPE (HCC): ICD-10-CM

## 2019-05-07 RX ORDER — LEVETIRACETAM 500 MG/1
TABLET ORAL
Qty: 180 TABLET | Refills: 3 | Status: SHIPPED | OUTPATIENT
Start: 2019-05-07 | End: 2020-06-05 | Stop reason: SDUPTHER

## 2019-06-17 ENCOUNTER — HOSPITAL ENCOUNTER (OUTPATIENT)
Dept: NEUROLOGY | Facility: AMBULATORY SURGERY CENTER | Age: 44
Discharge: HOME | End: 2019-06-17
Payer: COMMERCIAL

## 2019-06-17 DIAGNOSIS — R20.2 PARESTHESIA: ICD-10-CM

## 2019-06-17 PROCEDURE — 95909 NRV CNDJ TST 5-6 STUDIES: CPT | Performed by: PSYCHIATRY & NEUROLOGY

## 2019-06-17 PROCEDURE — 95886 MUSC TEST DONE W/N TEST COMP: CPT | Performed by: PSYCHIATRY & NEUROLOGY

## 2019-06-18 ENCOUNTER — TELEPHONE (OUTPATIENT)
Dept: NEUROLOGY | Facility: CLINIC | Age: 44
End: 2019-06-18

## 2020-06-05 DIAGNOSIS — G40.909 NONINTRACTABLE EPILEPSY WITHOUT STATUS EPILEPTICUS, UNSPECIFIED EPILEPSY TYPE (HCC): ICD-10-CM

## 2020-06-08 ENCOUNTER — OFFICE VISIT (OUTPATIENT)
Dept: NEUROLOGY | Facility: CLINIC | Age: 45
End: 2020-06-08
Payer: COMMERCIAL

## 2020-06-08 VITALS
TEMPERATURE: 98.7 F | SYSTOLIC BLOOD PRESSURE: 133 MMHG | BODY MASS INDEX: 28.14 KG/M2 | HEART RATE: 91 BPM | WEIGHT: 190 LBS | DIASTOLIC BLOOD PRESSURE: 92 MMHG | HEIGHT: 69 IN

## 2020-06-08 DIAGNOSIS — G40.009 PARTIAL IDIOPATHIC EPILEPSY WITH SEIZURES OF LOCALIZED ONSET, NOT INTRACTABLE, WITHOUT STATUS EPILEPTICUS (HCC): Primary | ICD-10-CM

## 2020-06-08 DIAGNOSIS — G56.02 CARPAL TUNNEL SYNDROME OF LEFT WRIST: ICD-10-CM

## 2020-06-08 PROCEDURE — 3008F BODY MASS INDEX DOCD: CPT | Performed by: NURSE PRACTITIONER

## 2020-06-08 PROCEDURE — 99214 OFFICE O/P EST MOD 30 MIN: CPT | Performed by: NURSE PRACTITIONER

## 2020-06-08 PROCEDURE — 1036F TOBACCO NON-USER: CPT | Performed by: NURSE PRACTITIONER

## 2020-06-08 RX ORDER — MULTIVIT WITH MINERALS/LUTEIN
250 TABLET ORAL DAILY
COMMUNITY

## 2020-06-08 RX ORDER — LEVETIRACETAM 500 MG/1
500 TABLET ORAL 2 TIMES DAILY
Qty: 180 TABLET | Refills: 3 | Status: SHIPPED | OUTPATIENT
Start: 2020-06-08 | End: 2021-04-28 | Stop reason: SDUPTHER

## 2021-04-27 ENCOUNTER — TELEPHONE (OUTPATIENT)
Dept: NEUROLOGY | Facility: CLINIC | Age: 46
End: 2021-04-27

## 2021-04-27 NOTE — TELEPHONE ENCOUNTER
ADD ON - patient is scheduled with Dr Jonelle Aranda on 4/28 @ 2:30 in [place] - insurance is still in force

## 2021-04-28 ENCOUNTER — OFFICE VISIT (OUTPATIENT)
Dept: NEUROLOGY | Facility: CLINIC | Age: 46
End: 2021-04-28
Payer: COMMERCIAL

## 2021-04-28 VITALS
DIASTOLIC BLOOD PRESSURE: 64 MMHG | WEIGHT: 190.2 LBS | HEART RATE: 60 BPM | SYSTOLIC BLOOD PRESSURE: 108 MMHG | BODY MASS INDEX: 28.09 KG/M2

## 2021-04-28 DIAGNOSIS — G40.909 NONINTRACTABLE EPILEPSY WITHOUT STATUS EPILEPTICUS, UNSPECIFIED EPILEPSY TYPE (HCC): Primary | ICD-10-CM

## 2021-04-28 DIAGNOSIS — F70 MILD INTELLECTUAL DISABILITY: ICD-10-CM

## 2021-04-28 DIAGNOSIS — G40.009 PARTIAL IDIOPATHIC EPILEPSY WITH SEIZURES OF LOCALIZED ONSET, NOT INTRACTABLE, WITHOUT STATUS EPILEPTICUS (HCC): ICD-10-CM

## 2021-04-28 DIAGNOSIS — G56.02 CARPAL TUNNEL SYNDROME OF LEFT WRIST: ICD-10-CM

## 2021-04-28 PROBLEM — J30.2 SEASONAL ALLERGIC RHINITIS: Status: ACTIVE | Noted: 2019-07-31

## 2021-04-28 PROBLEM — R20.2 PARESTHESIA: Status: RESOLVED | Noted: 2019-02-11 | Resolved: 2021-04-28

## 2021-04-28 PROBLEM — F43.29 STRESS AND ADJUSTMENT REACTION: Status: ACTIVE | Noted: 2019-07-31

## 2021-04-28 PROCEDURE — 99213 OFFICE O/P EST LOW 20 MIN: CPT | Performed by: PSYCHIATRY & NEUROLOGY

## 2021-04-28 RX ORDER — LEVETIRACETAM 500 MG/1
500 TABLET ORAL 2 TIMES DAILY
Qty: 180 TABLET | Refills: 3 | Status: SHIPPED | OUTPATIENT
Start: 2021-04-28 | End: 2022-04-26 | Stop reason: SDUPTHER

## 2021-04-28 NOTE — PROGRESS NOTES
Patient's Name: Obi Park   Patient's : 1975   Visit Type: follow-up  Referring MD / PCP:  Smitha Herrera MD     Assessment:    Mr Obi Park is a 41 y o  man with unknown/cryptogenic epilepsy syndrome, in the setting of underlying mild-moderate intellectual disability, no underlying structural pathology identified or EEG finding epileptiform discharges  His last break through seizure occurred when he was weaned off of phenobarbital  He has been doing well on levetiracetam without recurrent seizures or side effects  He has a stable complaint of left wrist pain/numbness, left carpal tunnel syndrome without progression of weakness of the thenar muscles  He will continue to use the wrist splint when possible and avoid repetitive flexion of the left wrist     Plan:   1 - Continue with Levetiracetam 500mg twice a day (despite low serum level, there have been no breakthrough seizures)  2 - in regards to his left CPS, I would recommend continuing to use the wrist splint when he can  3 - discussed that if the left carpal tunnel symptoms worsen where there is hand weakness, inability to hold onto objects, or difficulty with pinching grasp with thumb and index finger then further testing may be needed for decompression at the the carpal tunnel  4 - follow-up with AP in 1 year  Problem List Items Addressed This Visit        Nervous and Auditory    Epilepsy ([address] ) - Primary    Relevant Medications    levETIRAcetam (KEPPRA) 500 mg tablet    Carpal tunnel syndrome of left wrist       Other    Mild intellectual disability          Chief Complaint:    Chief Complaint   Patient presents with    Follow-up    Seizures    Carpal Tunnel     left        HPI:    Obi Park is a 41 y o  right handed male here for follow-up evaluation of epilepsy and left carpal tunnel syndrome  Interval History 2021  He has had consistent follow-up with Lillian Vora in  and     There were episodes of staring off noted by his mother but without tonic clonic activity  He had an EMG study that showed mild-moderate left carpal tunnel syndrome; he has been using wrist splints  There have been no episode of loss of consciousness, convulsion, or myoclonic activity  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, 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 that does not require heavy lifting  In the past, he was lifting 40 pounds cast iron pans  He does continue to use a wrist splint at home, he cannot use it at work because it'll get worse  AED/side effects/compliance:  Levetiracetam 500mg twice a day  No missed doses, mother regulates his medications  No side effects    Seizure semiology:  1  Generalized convulsion  2  Mother may notice some staring events but she is not sure if he is just inattentive  Prior Epilepsy History:  His first seizure occurred when he was 9months of age, convulsive; this was associated with a high fever  He was in [address]  The fever went away, no meningitis, no infection, no clear etiology of fever  He continued to have seizures every week until he was 15 years old  He was eventually controlled on phenobarbital and Dilantin  Seizures stopped, Dilantin was weaned off when he was 9years old  Phenobarbital was weaned off when he was 30years old  Then at age 28 he had a convulsive seizure, phenobarbital was restarted  He has been seizure free for the past 8 years  Mother does not know who his prior neurologist was other than she was associated with [address] [place] [address] in 2008  Summary [id number]  We transitioned him off of phenobarbital and onto levetiracetam 500mg twice a day  His mother noticed that he is more alert and active during the day, loss about 10 lbs since he has discontinued phenobarbital   She has not notice any behavioral problems, aggression, or irritability      Summary [id number]  There have been no seizures and he has yearly follow-ups for medication refills  Stable complaint of left CTS        Special Features  Status epilepticus: No  Self Injury Seizures: No  Precipitating Factors: None    Epilepsy Risk Factors:  Abnormal pregnancy: No  Abnormal birth/: No  Abnormal Development: walking at 18 months, speech was delayed, required a speech therapist for school  Febrile seizures, simple: Yes  Febrile seizures, complex: No  CNS infection: No  Mental retardation: Yes, learning disability, unable to read/write  Cerebral palsy: No  Head injury (moderate/severe): No  CNS neoplasm: No  CNS malformation: No  Neurosurgical procedure: No  Stroke: No  Alcohol abuse: No  Drug abuse: No  Consanguinity: No  Family history Sz/epilepsy: Maternal grand-uncle had seizure, paternal uncle with seizures    Prior AEDs:  Phenytoin, phenobarbital, levetiracetam    Prior workup:  x   Imaging:  MRI brain 2008  Tiny nonspecific foci of increased T2 signal intensities are within the subcortical white matter of both anterior frontal lobes bilaterally nonspecific microvascular ischemic disease    EEGs:  EEG dated 2008 interpreted by Dr Gabino James  normal sleep deprived EEG    EEG 9/10/2014 interpreted by Dr Álvaro Curz  Normal awake, excess beta is present    2019 - EMG/NCS of left arm  Antidromic left median digit 2 - 49 m/s (mild CTS)  EMG - normal needle study    Labs:  2019   Levetiracetam 7 8    General exam   /64 (BP Location: Left arm, Patient Position: Sitting, Cuff Size: Standard)   Pulse 60   Wt 86 3 kg (190 lb 3 2 oz)   BMI 28 09 kg/m²    Appearance: normally developed, appears well  Carotids: n/a  Cardiovascular: regular rate and rhythm and normal heart sounds  Pulmonary: clear to auscultation    HEENT: anicteric and moist mucus membranes / oral cavity   Fundoscopy: not assessed    Mental status  Orientation: alert and oriented to name, , Wednesday, Lenny Bustos, [address] [place] [address] of Knowledge: limited   Attention and Concentration: not assessed  Current and Remote Memory:not assessed  Language: difficulty with initiating speech, limited output on his own, comprehension is intact and naming is intact    Cranial Nerves  CN 1: not tested  CN 2: pupils equal round reactive to direct and consenual light   CN 3, 4, 6: EOMI, no nystagmus  CN 5:not assessed  CN 7:not assessed  CN 8:not assessed  CN 9, 10:no dysarthria present  CN 11:not assessed  CN 12:not assessed    Motor:  Bulk, Tone: normal bulk, normal tone  Pronation: there is mild orbiting around his left arm  Strength: difficulty with confrontational testing, difficulty with position of his fingers in the right position while testing; along with difficulty regarding the type of motion/movement that I was testing  There is no lateralizing weakness  Sensory:  Lighttouch: intact in all limbs  Pinprick: intact at all finger tips, bilateral palmar region of the hands and forearm  Romberg:not assessed    Coordination:  FNF:FNF bilaterally intact  ROLAND:clumsy with the left hand / arm and clumsy with the right hand / arm  FFM:intact  Gait/Station:normal gait    Reflexes:  He was not able to relax for testing of the reflexes  Past Medical/Surgical History:  Patient Active Problem List   Diagnosis    Mild intellectual disability    Hyperbilirubinemia    Epilepsy ([address] )    Carpal tunnel syndrome of left wrist    Overweight (BMI 25 0-29  9)    Medicare annual wellness visit, subsequent    Seasonal allergic rhinitis    Stress and adjustment reaction     Past Surgical History:   Procedure Laterality Date    APPENDECTOMY         Past Psychiatric History:  Depression: No  Anxiety: No  Psychosis: No    Medications:    Current Outpatient Medications:     ascorbic acid (VITAMIN C) 250 mg tablet, Take 250 mg by mouth daily, Disp: , Rfl:     Elastic Bandages & Supports (FUTURO WRIST BRACE WATER RES) MISC, by Does not apply route as needed (for carpal tunnel symptoms) Left hand wrist splint, water resistent, Disp: 1 each, Rfl: 0    Elastic Bandages & Supports (THUMB SPLINT/LEFT MEDIUM) MISC, Thumb Spica Splint, Left , Disp: 1 each, Rfl: 0    levETIRAcetam (KEPPRA) 500 mg tablet, Take 1 tablet (500 mg total) by mouth 2 (two) times a day, Disp: 180 tablet, Rfl: 3    Multiple Vitamin (MULTIVITAMIN) capsule, Take 1 capsule by mouth daily, Disp: , Rfl:     Omega-3 Fatty Acids (FISH OIL) 645 MG CAPS, Take 1 capsule by mouth daily, Disp: , Rfl:     FLUZONE QUADRIVALENT 0 5 ML ALDAIR, TO BE ADMINISTERED BY PHARMACIST FOR IMMUNIZATION, Disp: , Rfl: 0    Allergies: Allergies   Allergen Reactions    Pollen Extract Other (See Comments)       Family history:  Family History   Problem Relation Age of Onset    Hyperlipidemia Mother         high cholesterol     Maternal grand-uncle had seizure, paternal uncle with seizures    Social History  Living situation:  lives with mother  Work:  No, went to school to 25years old, he works as a  nine out of 12 months of the year  Driving:  No   reports that he has never smoked  He has never used smokeless tobacco  He reports that he does not drink alcohol or use drugs  Review of Systems  A review of at least 12 organ/systems was obtained by the medical assistant and reviewed by me, including additional positives/negatives:  A review of at least 12 organ/systems was evaluated and there are no complaints  Decision making was of high-complexity due to the patient's high risk condition (seizures), psychiatric and neuropsychological comorbidities, behavioral problems, memory and cognitive problems and medication side effects  The total amount of time spent with the patient along with pre-chart and post-chart preparation was 20 minutes on the calendar day of the date of service    This included history taking, physical exam, review of ancillary testing, counseling provided to the patient regarding diagnosis, medications, treatment, and risk management, and other communication to the patient's providers and/or family    Start time: 2:37PM  End time: 2:57PM

## 2021-04-28 NOTE — PATIENT INSTRUCTIONS
He has left hand / wrist complaint of pain, but due to his intellectual disability, he has a difficult time understanding the sensory testing to confirm the distribution of median neuropathy; however, there is no motor weakness of the left hand  Plan:   1 - Continue with Levetiracetam 500mg twice a day (despite low serum level, there have been no breakthrough seizures)  2 - in regards to his left CPS, I would recommend continuing to use the wrist splint when he can  3 - discussed that if the left carpal tunnel symptoms worsen where there is hand weakness, inability to hold onto objects, or difficulty with pinching grasp with thumb and index finger then further testing may be needed for decompression at the the carpal tunnel    4 - follow-up with AP in 1 year (Lillian Vora)

## 2021-12-07 ENCOUNTER — TELEPHONE (OUTPATIENT)
Dept: NEUROLOGY | Facility: CLINIC | Age: 46
End: 2021-12-07

## 2022-04-26 ENCOUNTER — OFFICE VISIT (OUTPATIENT)
Dept: NEUROLOGY | Facility: CLINIC | Age: 47
End: 2022-04-26
Payer: MEDICARE

## 2022-04-26 VITALS
SYSTOLIC BLOOD PRESSURE: 130 MMHG | TEMPERATURE: 97.2 F | HEART RATE: 69 BPM | BODY MASS INDEX: 28.6 KG/M2 | WEIGHT: 193.7 LBS | DIASTOLIC BLOOD PRESSURE: 82 MMHG

## 2022-04-26 DIAGNOSIS — F70 MILD INTELLECTUAL DISABILITY: ICD-10-CM

## 2022-04-26 DIAGNOSIS — G40.909 NONINTRACTABLE EPILEPSY WITHOUT STATUS EPILEPTICUS, UNSPECIFIED EPILEPSY TYPE (HCC): ICD-10-CM

## 2022-04-26 DIAGNOSIS — G56.02 CARPAL TUNNEL SYNDROME OF LEFT WRIST: Primary | ICD-10-CM

## 2022-04-26 PROCEDURE — 99214 OFFICE O/P EST MOD 30 MIN: CPT | Performed by: NURSE PRACTITIONER

## 2022-04-26 RX ORDER — LEVETIRACETAM 500 MG/1
500 TABLET ORAL 2 TIMES DAILY
Qty: 180 TABLET | Refills: 3 | Status: SHIPPED | OUTPATIENT
Start: 2022-04-26

## 2022-04-26 NOTE — PATIENT INSTRUCTIONS
- continue current dose of levetiracetam (keppra) 500 mg twice per day  - call the office with any breakthrough seizures or concerns  - have your blood work done - wait until you see your PCP as they may want to check some blood work as well   This way you can get all your blood work done at the same time  - Follow up in 1 year

## 2022-04-26 NOTE — PROGRESS NOTES
Patient ID: Shannon Clemens is a 40 y o  male with unknown/cryptogenic epilepsy syndrome, in the setting of underlying mild-moderate intellectual disability, who is returning to Neurology office for follow up of his seizures  Assessment/Plan:    Epilepsy [facility])  Patient with unknown/cryptogenic epilepsy seizure who continues to be seizure free on levetiracetam 500 mg BID  He denies any side effects of this medication and confirms good compliance  Prior EEGs have been unrevealing  MRI brain with tiny nonspecific foci of increased T2 signal intensities are within the subcortical white matter of both anterior frontal lobes bilaterally nonspecific microvascular ischemic disease  Exam is non-focal     Patient will continue with levetiracetam 500 mg BID  While his levetiracetam level in the past has been on the low side, since he has not had any seizures in quite some time, no changes to dosing are necessary  If there are any breakthrough seizures in the future, his dose could certainly be increased  Since patient has not had blood work in over one year, will check CBC, CMP and levetiracetam  Discussed with patient and mother that this is not urgent and can wait until he is seen by PCP in near future in case they would also like blood work to be done so that he only has to go once for blood work  Patient or mother will call the office with breakthrough seizures or concerns  Follow up in 1 year or sooner if needed  Carpal tunnel syndrome of left wrist  EMG in 2019 with mild, median motor neuropathy at the left wrist (carpal tunnel syndrome)  Patient reports no worsening of symptoms  Feels that this has actually been better since changing duties at work  While he does experience intermittent paresthesias, there is no weakness or issues with dexterity  Patient or mother will call with worsening of symptoms or concerns  Mild intellectual disability  At baseline level of functioning   Mother present at visit today  Continues to work full time  Continue with supportive care as needed  He will Return in about 1 year (around 4/26/2023) for follow up with Sadia Galvez  Subjective:  Mely Farooq is a 40 y o  male with unknown/cryptogenic epilepsy syndrome, in the setting of underlying mild-moderate intellectual disability, who is returning to Neurology office for follow up of his seizures  He was last seen in the office by Dr Cherelle Escoto on 4/28/2021  At that time, it was noted that there was no underlying structural pathology identified or EEGs with epileptiform discharges  Most recent breakthrough seizure occurred when he was weaned off of phenobarbital  He continued on levetiracetam 500 mg BID without side effects or breakthrough seizures, noted that despite low serum level there were no breakthrough seizures  There is also left carpal tunnel syndrome without progression of weakness of the thenar muscles  Recommended that if the left carpal tunnel symptoms worsen where there is hand weakness, inability to hold onto objects, or difficulty with pinching grasp with thumb and index finger then further testing may be needed for decompression at the the carpal tunnel  Recommended 1 year follow up  Since his last visit, he has continues to be seizure free  Continues on levetiracetam 500 mg twice per day  Denies any side effects  Patient and mother confirm compliance with medications and deny missed doses  He reports that his carpal tunnel is actually better  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  Sleep is good  No staring spells  No evidence of nocturnal seizure  No concerning myoclonus  Current seizure medications:  - levetiracetam 500 mg BID  Other medications as per Epic  Seizure semiology:  1  Generalized convulsion  2  Mother may notice some staring events but she is not sure if he is just inattentive      Special Features  Status epilepticus: No  Self Injury Seizures: No  Precipitating Factors: None     Epilepsy Risk Factors:  Abnormal pregnancy: No  Abnormal birth/: No  Abnormal Development: walking at 18 months, speech was delayed, required a speech therapist for school  Febrile seizures, simple: Yes  Febrile seizures, complex: No  CNS infection: No  Mental retardation: Yes, learning disability, unable to read/write  Cerebral palsy: No  Head injury (moderate/severe): No  CNS neoplasm: No  CNS malformation: No  Neurosurgical procedure: No  Stroke: No  Alcohol abuse: No  Drug abuse: No  Consanguinity: No  Family history Sz/epilepsy: Maternal grand-uncle had seizure, paternal uncle with seizures     Prior AEDs:  Phenytoin, phenobarbital, levetiracetam     Prior workup:  x   Imaging:  MRI brain 2008  Tiny nonspecific foci of increased T2 signal intensities are within the subcortical white matter of both anterior frontal lobes bilaterally nonspecific microvascular ischemic disease     EEGs:  EEG dated 2008 interpreted by Dr Heidi Blanton  normal sleep deprived EEG     EEG 9/10/2014 interpreted by Dr Huy Mendieta  Normal awake, excess beta is present     2019 - EMG/NCS of left arm  Antidromic left median digit 2 - 49 m/s (mild CTS)  EMG - normal needle study    His history was also obtained from his mother, who was present at today's visit  I reviewed prior neurology notes, EMG report, most recent labs, as documented in Epic/[facility], and summarized above  Objective:    Blood pressure 130/82, pulse 69, temperature (!) 97 2 °F (36 2 °C), weight 87 9 kg (193 lb 11 2 oz)  Physical Exam  No apparent distress  Appears well nourished  Mood appropriate for situation     Neurologic Exam  Mental status- alert and oriented to person, place, and time  Speech with limited output but appropriate content for conversation  Limited attention and fund of knowledge        Cranial Nerves- PERRL, EOMS normal, facial sensation and muscles symmetric, hearing intact bilaterally to finger rubs, tongue midline, palate rise symmetrical, shoulder shrug symmetrical     Motor- No pronator drift  5/5 strength all extremities  Moves all extremities freely  No tremor  Hand  equal  Pincer equal  Wrist strength equal      Sensory-  Intact distally in all extremities to light touch  DTRs- attempted but patient could not fully relax for reflex testing  Gait- normal casual gait  Coordination- FNF intact  ROS:  Review of Systems   Constitutional: Negative  Negative for appetite change and fever  HENT: Negative  Negative for hearing loss, tinnitus, trouble swallowing and voice change  Eyes: Negative  Negative for photophobia and pain  Respiratory: Negative  Negative for shortness of breath  Cardiovascular: Negative  Negative for palpitations  Gastrointestinal: Negative  Negative for nausea and vomiting  Endocrine: Negative  Negative for cold intolerance  Genitourinary: Negative  Negative for dysuria, frequency and urgency  Musculoskeletal: Negative  Negative for myalgias and neck pain  Skin: Negative  Negative for rash  Neurological: Negative  Negative for dizziness, tremors, seizures, syncope, facial asymmetry, speech difficulty, weakness, light-headedness, numbness and headaches  Hematological: Negative  Does not bruise/bleed easily  Psychiatric/Behavioral: Negative  Negative for confusion, hallucinations and sleep disturbance  All other systems reviewed and are negative      ROS obtained by MA and reviewed by myself  This note may have been created using voice recognition software  There may be unintentional errors such as grammatical errors, spelling errors, or pronoun errors

## 2022-04-26 NOTE — ASSESSMENT & PLAN NOTE
EMG in 2019 with mild, median motor neuropathy at the left wrist (carpal tunnel syndrome)  Patient reports no worsening of symptoms  Feels that this has actually been better since changing duties at work  While he does experience intermittent paresthesias, there is no weakness or issues with dexterity  Patient or mother will call with worsening of symptoms or concerns

## 2022-04-26 NOTE — ASSESSMENT & PLAN NOTE
Patient with unknown/cryptogenic epilepsy seizure who continues to be seizure free on levetiracetam 500 mg BID  He denies any side effects of this medication and confirms good compliance  Prior EEGs have been unrevealing  MRI brain with tiny nonspecific foci of increased T2 signal intensities are within the subcortical white matter of both anterior frontal lobes bilaterally nonspecific microvascular ischemic disease  Exam is non-focal     Patient will continue with levetiracetam 500 mg BID  While his levetiracetam level in the past has been on the low side, since he has not had any seizures in quite some time, no changes to dosing are necessary  If there are any breakthrough seizures in the future, his dose could certainly be increased  Since patient has not had blood work in over one year, will check CBC, CMP and levetiracetam  Discussed with patient and mother that this is not urgent and can wait until he is seen by PCP in near future in case they would also like blood work to be done so that he only has to go once for blood work  Patient or mother will call the office with breakthrough seizures or concerns  Follow up in 1 year or sooner if needed

## 2023-05-12 NOTE — PROGRESS NOTES
Problem: Adult Inpatient Plan of Care  Goal: Plan of Care Review  Outcome: Ongoing, Progressing  Goal: Patient-Specific Goal (Individualized)  Outcome: Ongoing, Progressing  Goal: Absence of Hospital-Acquired Illness or Injury  Outcome: Ongoing, Progressing  Intervention: Identify and Manage Fall Risk  Recent Flowsheet Documentation  Taken 5/12/2023 1010 by Christianne Og LPN  Safety Promotion/Fall Prevention:   safety round/check completed   clutter free environment maintained   activity supervised   assistive device/personal items within reach  Taken 5/12/2023 0820 by Christianne Og LPN  Safety Promotion/Fall Prevention:   safety round/check completed   assistive device/personal items within reach   activity supervised  Intervention: Prevent Skin Injury  Recent Flowsheet Documentation  Taken 5/12/2023 0820 by Christianne gO LPN  Skin Protection: adhesive use limited  Intervention: Prevent and Manage VTE (Venous Thromboembolism) Risk  Recent Flowsheet Documentation  Taken 5/12/2023 0820 by Christianne Og LPN  Range of Motion: active ROM (range of motion) encouraged  Intervention: Prevent Infection  Recent Flowsheet Documentation  Taken 5/12/2023 1010 by Christianne Og LPN  Infection Prevention: hand hygiene promoted  Taken 5/12/2023 0820 by Christianne Og LPN  Infection Prevention: hand hygiene promoted  Goal: Optimal Comfort and Wellbeing  Outcome: Ongoing, Progressing  Intervention: Provide Person-Centered Care  Recent Flowsheet Documentation  Taken 5/12/2023 0820 by Christianne Og LPN  Trust Relationship/Rapport: care explained  Goal: Readiness for Transition of Care  Outcome: Ongoing, Progressing     Problem: COPD (Chronic Obstructive Pulmonary Disease) Comorbidity  Goal: Maintenance of COPD Symptom Control  Outcome: Ongoing, Progressing  Intervention: Maintain COPD-Symptom Control  Recent Flowsheet Documentation  Taken 5/12/2023 1010 by Christianne Og LPN  Medication  Delmer Wade 1975 male MRN: [id number]    Family Medicine Acute Visit    ASSESSMENT/PLAN  Problem List Items Addressed This Visit     None      Visit Diagnoses     De Quervain's tenosynovitis, left    -  Primary    -thumb spica wrist brace ordered  -Tylenol, Ice PRN  -follow up in 1 month if not improved    Relevant Medications    Elastic Bandages & Supports ([address] LEFT HAND WRIST BRACE) [address] [facility]                Future Appointments  Date Time Provider Lisa Martinez   4/19/2018 3:00 PM DO BISI Martinez BE  Practice-Com   6/7/2018 2:00 PM Mary Marte MD NEURO [facility]          SUBJECTIVE  CC: Wrist Pain (left wrist and hand pain x 1 week)      HPI:  Delmer Wade is a 40 y o  male who presents for acute visit for 1 week history of left wrist and third digit pain  3/10 constant pain  Using it makes it worse  Occurred in the past and used a splint with good success - have since lost the splint  Intermittent numbness  Works as a  - lifts heavy pots and pans  Otherwise ROS negative  Review of Systems   Constitutional: Negative for chills and fever  Respiratory: Negative for cough, chest tightness and shortness of breath  Cardiovascular: Negative for chest pain and palpitations  Musculoskeletal:        Wrist pain   Neurological: Positive for numbness  Historical Information   The patient history was reviewed as follows:  No past medical history on file  No past surgical history on file  No family history on file     Social History   History   Alcohol use Not on file     History   Drug use: Unknown     History   Smoking Status    Not on file   Smokeless Tobacco    Not on file       Medications:     Current Outpatient Prescriptions:     levETIRAcetam (KEPPRA) 500 mg tablet, Take 1 tablet by mouth 2 (two) times a day, Disp: , Rfl:     Omega-3 Fatty Acids (FISH OIL) 645 MG CAPS, Take 1 capsule by mouth daily, Disp: , Rfl:     Elastic Bandages & Supports ([address] LEFT HAND WRIST BRACE) [address] [facility], by Does not apply route daily Left handed thumb spica splint/brace  , Disp: 1 each, Rfl: 0    No Known Allergies    OBJECTIVE  Vitals:   Vitals:    03/29/18 1522   BP: 122/78   Pulse: 84   Resp: 16   Temp: (!) 96 4 °F (35 8 °C)   Weight: 82 6 kg (182 lb 3 2 oz)   Height: 5' 9 9" (1 775 m)         Physical Exam   Constitutional: He is oriented to person, place, and time  He appears well-developed and well-nourished  No distress  HENT:   Head: Normocephalic and atraumatic  Eyes: Conjunctivae are normal  Right eye exhibits no discharge  Left eye exhibits no discharge  Musculoskeletal:   Left wrist/hand: (+) Finkelstein's  Mid diffuse tenderness over dorsal and palmar aspect of wrist without pinpoint tenderness  Strength intact, sensation intact  No muscle wasting present  Neurological: He is alert and oriented to person, place, and time  No cranial nerve deficit  Skin: He is not diaphoretic  Nursing note and vitals reviewed                   Alex Strickland DO, PGY-3  [facility]   3/30/2018 Review/Management: medications reviewed  Taken 5/12/2023 0820 by Christianne Og LPN  Supportive Measures: active listening utilized  Medication Review/Management: medications reviewed     Problem: Hypertension Comorbidity  Goal: Blood Pressure in Desired Range  Outcome: Ongoing, Progressing  Intervention: Maintain Blood Pressure Management  Recent Flowsheet Documentation  Taken 5/12/2023 1010 by Christianne Og LPN  Medication Review/Management: medications reviewed  Taken 5/12/2023 0820 by Christianne Og LPN  Syncope Management: position changed slowly  Medication Review/Management: medications reviewed   Goal Outcome Evaluation:         Patient to d/c home today. Family to transport. VSS.

## 2023-06-30 ENCOUNTER — TELEPHONE (OUTPATIENT)
Dept: NEUROLOGY | Facility: CLINIC | Age: 48
End: 2023-06-30

## 2023-06-30 NOTE — TELEPHONE ENCOUNTER
Received vm from 1:26pm-[place]. Hi, this is Tristen donovan and I am calling from [employer]. I'm calling regarding one of my clients jr middleton. He is in need of a Dr script or basically a note explaining all his medical conditions. If you'd be able to send that over to my email or his mom's, that would be great. Thank you so much. And my email is [email or web address]   [phone]

## 2023-06-30 NOTE — LETTER
To Whom it May Concern:     Ziyad Koroma ([birth year]) is under my professional care and is a patient in this office. He was last seen in the office on 4/14/2023. Mr. oKroma has a diagnosis of nonintractable epilepsy without status epilepticus.     If you have any questions or concerns, please don't hesitate to call.           Sincerely,            OMAR Moore

## 2023-07-10 NOTE — TELEPHONE ENCOUNTER
Called number below and spoke with pt's mother, who is also his POA, and she provided verbal permission to send a letter to below e-mail address. Will draft letter and send it on Thursday when this writer is in the office.

## 2023-11-14 ENCOUNTER — TELEPHONE (OUTPATIENT)
Dept: NEUROLOGY | Facility: CLINIC | Age: 48
End: 2023-11-14

## 2023-11-14 NOTE — TELEPHONE ENCOUNTER
Patient mother is requesting a letter for patient to excuse him for jury duty. Patient has disability and seizure. The court date is on 1/8/24 but needs it as soon as possible needs to mail it. Please call his mother when she can pick it up.     Juror # [id number]

## 2023-11-14 NOTE — TELEPHONE ENCOUNTER
[address] [facility]  Neurology [facility] Clinical Team 14 hours ago (10:45 AM)       Ok to write. Patient has intellectual disability and diagnosis of epilepsy.

## 2023-11-14 NOTE — LETTER
November 14, 2023     [address] [facility]    Patient: Yon Nowak   YOB: 1975       To Whom It May Concern:    Luís Hackett follows with [facility] for his diagnosis of epilepsy. Please excuse him from jury duty due to diagnosis of epilepsy and intellectual disability. Juror ID number: [id number]     Please feel free to contact our office if further questions.     Sincerely,    Elpidio Geller

## 2024-02-21 PROBLEM — Z00.00 MEDICARE ANNUAL WELLNESS VISIT, SUBSEQUENT: Status: RESOLVED | Noted: 2018-11-28 | Resolved: 2024-02-21

## 2024-05-09 ENCOUNTER — TELEPHONE (OUTPATIENT)
Dept: NEUROLOGY | Facility: CLINIC | Age: 49
End: 2024-05-09

## 2024-05-09 DIAGNOSIS — G40.909 NONINTRACTABLE EPILEPSY WITHOUT STATUS EPILEPTICUS, UNSPECIFIED EPILEPSY TYPE (HCC): ICD-10-CM

## 2024-05-09 RX ORDER — LEVETIRACETAM 500 MG/1
500 TABLET ORAL 2 TIMES DAILY
Qty: 180 TABLET | Refills: 1 | Status: SHIPPED | OUTPATIENT
Start: 2024-05-09 | End: 2024-05-10 | Stop reason: SDUPTHER

## 2024-05-10 ENCOUNTER — OFFICE VISIT (OUTPATIENT)
Dept: NEUROLOGY | Facility: CLINIC | Age: 49
End: 2024-05-10
Payer: MEDICARE

## 2024-05-10 VITALS
SYSTOLIC BLOOD PRESSURE: 122 MMHG | HEART RATE: 74 BPM | HEIGHT: 69 IN | TEMPERATURE: 98.3 F | DIASTOLIC BLOOD PRESSURE: 78 MMHG | BODY MASS INDEX: 29.61 KG/M2 | WEIGHT: 199.9 LBS

## 2024-05-10 DIAGNOSIS — G40.909 NONINTRACTABLE EPILEPSY WITHOUT STATUS EPILEPTICUS, UNSPECIFIED EPILEPSY TYPE (HCC): ICD-10-CM

## 2024-05-10 PROCEDURE — 99214 OFFICE O/P EST MOD 30 MIN: CPT | Performed by: NURSE PRACTITIONER

## 2024-05-10 RX ORDER — LEVETIRACETAM 500 MG/1
500 TABLET ORAL 2 TIMES DAILY
Qty: 180 TABLET | Refills: 3 | Status: SHIPPED | OUTPATIENT
Start: 2024-05-10

## 2024-05-10 NOTE — PROGRESS NOTES
Patient ID: Ziyad Koroma is a 48 y.o. male with unknown/cryptogenic epilepsy syndrome, in the setting of underlying mild-moderate intellectual disability, who is returning to Neurology office for follow up of his seizures.       Assessment/Plan:    Epilepsy (HCC)  Patient with unknown/cryptogenic epilepsy seizure who continues to be seizure free on levetiracetam 500 mg BID. He denies any side effects of this medication and confirms good compliance.     Prior EEGs have been unrevealing. MRI brain with tiny nonspecific foci of increased T2 signal intensities are within the subcortical white matter of both anterior frontal lobes bilaterally nonspecific microvascular ischemic disease.     Patient will continue with levetiracetam 500 mg BID. If there are any breakthrough seizures in the future, his dose could certainly be increased. Recommend follow up in 1 year, blood work to be done in the next few weeks to check CBC, CMP, and levetiracetam level.     He will Return in about 1 year (around 5/10/2025) for Follow up with Dr Hassan.      Subjective:  Ziyad Koroma is a 48 y.o. male with unknown/cryptogenic epilepsy syndrome, in the setting of underlying mild-moderate intellectual disability, who is returning to Neurology office for follow up of his seizures.  He was last seen in the office on 4/14/2023. At that time, he continued to be seizure free on levetiracetam 500 mg BID without issues. Recommended blood work in a few months. He did have carpal tunnel which was confirmed with EEG in 2019. Noted intermittent paresthesias without weakness or issues with dexterity. Discussed wrist bracing at night time. If there was worsening of symptoms or concerns they were to call the office. Recommended 1 year follow up.     Since their last visit, he continues to be seizure free on levetiracetam 500 mg twice per day.     Denies any issues in his hands. Mother confirms that he does not complain of any issues.     No new medical issues  "or concerns.     No staring spells. No evidence of nocturnal seizure. No concerning myoclonus.     Current seizure medications:  - levetiracetam 500 mg BID  Other medications as per [facility].    Seizure semiology:  Generalized convulsion  Mother may notice some staring events but she is not sure if he is just inattentive.     Special Features  Status epilepticus: No  Self Injury Seizures: No  Precipitating Factors: None     Epilepsy Risk Factors:  Abnormal pregnancy: No  Abnormal birth/: No  Abnormal Development: walking at 18 months, speech was delayed, required a speech therapist for school  Febrile seizures, simple: Yes  Febrile seizures, complex: No  CNS infection: No  Mental retardation: Yes, learning disability, unable to read/write  Cerebral palsy: No  Head injury (moderate/severe): No  CNS neoplasm: No  CNS malformation: No  Neurosurgical procedure: No  Stroke: No  Alcohol abuse: No  Drug abuse: No  Consanguinity: No  Family history Sz/epilepsy: Maternal grand-uncle had seizure, paternal uncle with seizures     Prior AEDs:  Phenytoin, phenobarbital, levetiracetam     Prior workup:  x   Imaging:  MRI brain 2008  Tiny nonspecific foci of increased T2 signal intensities are within the subcortical white matter of both anterior frontal lobes bilaterally nonspecific microvascular ischemic disease     EEGs:  EEG dated 2008 interpreted by Dr. GARCIA  normal sleep deprived EEG     EEG 9/10/2014 interpreted by Dr. Jaramillo  Normal awake, excess beta is present     2019 - EMG/NCS of left arm  Antidromic left median digit 2 - 49 m/s (mild CTS)  EMG - normal needle study     I reviewed prior neurology notes, most recent labs, as documented in Epic/[facility]where, and summarized above.        Objective:    Blood pressure 122/78, pulse 74, temperature 98.3 °F (36.8 °C), temperature source Temporal, height 5' 9\" (1.753 m), weight 90.7 kg (199 lb 14.4 oz).    No apparent distress.   Appears well " nourished.   Mood appropriate for situation     Neurologic Exam  Mental status- alert and oriented to person, place, and time. Speech with limited output but appropriate content for conversation. Limited attention and fund of knowledge.       Cranial Nerves- PERRL, EOMS normal, facial sensation and muscles symmetric, hearing intact bilaterally to finger rubs, tongue midline, palate rise symmetrical, shoulder shrug symmetrical.     Motor- No pronator drift. 5/5 strength all extremities. Moves all extremities freely. No tremor. Equal  strength.      Sensory-  Intact distally in all extremities to light touch.      DTRs- attempted but patient could not fully relax for reflex testing.      Gait- normal casual gait.      Coordination- FNF intact.     ROS:  Review of Systems   Constitutional:  Negative for appetite change, fatigue and fever.   HENT: Negative.  Negative for hearing loss, tinnitus, trouble swallowing and voice change.    Eyes: Negative.  Negative for photophobia, pain and visual disturbance.   Respiratory: Negative.  Negative for shortness of breath.    Cardiovascular: Negative.  Negative for palpitations.   Gastrointestinal: Negative.  Negative for nausea and vomiting.   Endocrine: Negative.  Negative for cold intolerance.   Genitourinary: Negative.  Negative for dysuria, frequency and urgency.   Musculoskeletal:  Negative for back pain, gait problem, myalgias, neck pain and neck stiffness.   Skin: Negative.  Negative for rash.   Allergic/Immunologic: Negative.    Neurological: Negative.  Negative for dizziness, tremors, seizures, syncope, facial asymmetry, speech difficulty, weakness, light-headedness, numbness and headaches.   Hematological: Negative.  Does not bruise/bleed easily.   Psychiatric/Behavioral: Negative.  Negative for confusion, hallucinations and sleep disturbance.    All other systems reviewed and are negative.     ROS obtained by MA and reviewed by myself.     This note may have been  created using voice recognition software. There may be unintentional errors such as grammatical errors, spelling errors, or pronoun errors.

## 2024-05-10 NOTE — PATIENT INSTRUCTIONS
- Continue current dose of levetiracetam 500 mg BID  - Have blood work in a few weeks - call PCP and see if there is any blood work that they may want so he only has to go once  - Call the office with seizures, issues or concerns  - Follow up in 1 year or sooner if needed with Dr Hassan

## 2024-05-13 NOTE — ASSESSMENT & PLAN NOTE
Patient with unknown/cryptogenic epilepsy seizure who continues to be seizure free on levetiracetam 500 mg BID. He denies any side effects of this medication and confirms good compliance.     Prior EEGs have been unrevealing. MRI brain with tiny nonspecific foci of increased T2 signal intensities are within the subcortical white matter of both anterior frontal lobes bilaterally nonspecific microvascular ischemic disease.     Patient will continue with levetiracetam 500 mg BID. If there are any breakthrough seizures in the future, his dose could certainly be increased. Recommend follow up in 1 year, blood work to be done in the next few weeks to check CBC, CMP, and levetiracetam level.

## 2024-10-03 DIAGNOSIS — G40.909 NONINTRACTABLE EPILEPSY WITHOUT STATUS EPILEPTICUS, UNSPECIFIED EPILEPSY TYPE (HCC): ICD-10-CM

## 2024-10-03 NOTE — TELEPHONE ENCOUNTER
Patient moved to [place]  a few days ago and lost his script in the process of moving. Patient is asking for a new script.     Reason for call:   [x] Refill   [] Prior Auth  [] Other:     Office:   [] PCP/Provider -   [x] Specialty/Provider - [facility]  Authorized By: OMAR Starr    Medication: levETIRAcetam (KEPPRA) 500 mg tablet     Dose/Frequency: Take 1 tablet (500 mg total) by mouth 2 (two) times a day     Quantity: 180 tablet     Pharmacy: [id number] [address]     Does the patient have enough for 3 days?   [] Yes   [x] No - Send as HP to POD

## 2024-10-07 RX ORDER — LEVETIRACETAM 500 MG/1
500 TABLET ORAL 2 TIMES DAILY
Qty: 180 TABLET | Refills: 1 | Status: SHIPPED | OUTPATIENT
Start: 2024-10-07

## 2025-07-17 ENCOUNTER — TELEPHONE (OUTPATIENT)
Dept: NEUROLOGY | Facility: CLINIC | Age: 50
End: 2025-07-17

## 2025-07-17 NOTE — TELEPHONE ENCOUNTER
Received via [employer] from [employer]'s request for refill for Levetiracetam 500 mg tablets.  Request scanned into [employer].

## 2025-07-23 DIAGNOSIS — G40.909 NONINTRACTABLE EPILEPSY WITHOUT STATUS EPILEPTICUS, UNSPECIFIED EPILEPSY TYPE (HCC): ICD-10-CM

## 2025-07-23 NOTE — TELEPHONE ENCOUNTER
Outbound call made to patient     [facility] ro call office back to schedule appt for future medication refills.

## 2025-07-24 RX ORDER — LEVETIRACETAM 500 MG/1
500 TABLET ORAL 2 TIMES DAILY
Qty: 180 TABLET | Refills: 0 | OUTPATIENT
Start: 2025-07-24